# Patient Record
Sex: FEMALE | Race: BLACK OR AFRICAN AMERICAN | NOT HISPANIC OR LATINO | Employment: FULL TIME | URBAN - METROPOLITAN AREA
[De-identification: names, ages, dates, MRNs, and addresses within clinical notes are randomized per-mention and may not be internally consistent; named-entity substitution may affect disease eponyms.]

---

## 2023-04-06 LAB
CLUE CELLS: NORMAL
NUGENT SCORE: 1
YEAST: NORMAL

## 2023-04-07 LAB
CHLAMYDIA TRACH., AMPLIFIED: NEGATIVE
N. GONORRHEA, AMPLIFIED: NEGATIVE
TRICHOMONAS VAGINALIS: NEGATIVE

## 2023-06-27 ENCOUNTER — APPOINTMENT (OUTPATIENT)
Dept: LAB | Facility: LAB | Age: 43
End: 2023-06-27
Payer: COMMERCIAL

## 2023-07-19 DIAGNOSIS — I10 PRIMARY HYPERTENSION: ICD-10-CM

## 2023-07-19 RX ORDER — AMLODIPINE BESYLATE 10 MG/1
10 TABLET ORAL DAILY
COMMUNITY
End: 2023-07-19 | Stop reason: SDUPTHER

## 2023-07-19 RX ORDER — DICLOFENAC SODIUM 75 MG/1
75 TABLET, DELAYED RELEASE ORAL 2 TIMES DAILY
COMMUNITY
Start: 2023-03-13 | End: 2024-01-29 | Stop reason: WASHOUT

## 2023-07-19 RX ORDER — ALBUTEROL SULFATE 90 UG/1
2 AEROSOL, METERED RESPIRATORY (INHALATION) EVERY 4 HOURS PRN
COMMUNITY
Start: 2014-01-30 | End: 2024-01-29 | Stop reason: WASHOUT

## 2023-07-19 RX ORDER — AMLODIPINE BESYLATE 10 MG/1
10 TABLET ORAL DAILY
Qty: 30 TABLET | Refills: 11 | Status: SHIPPED | OUTPATIENT
Start: 2023-07-19 | End: 2024-07-18

## 2023-08-22 LAB
ALANINE AMINOTRANSFERASE (SGPT) (U/L) IN SER/PLAS: 21 U/L (ref 7–45)
ALBUMIN (G/DL) IN SER/PLAS: 3.8 G/DL (ref 3.4–5)
ALKALINE PHOSPHATASE (U/L) IN SER/PLAS: 100 U/L (ref 33–110)
ANION GAP IN SER/PLAS: 8 MMOL/L (ref 10–20)
APPEARANCE, URINE: CLEAR
ASPARTATE AMINOTRANSFERASE (SGOT) (U/L) IN SER/PLAS: 18 U/L (ref 9–39)
BASOPHILS (10*3/UL) IN BLOOD BY AUTOMATED COUNT: 0.04 X10E9/L (ref 0–0.1)
BASOPHILS/100 LEUKOCYTES IN BLOOD BY AUTOMATED COUNT: 0.6 % (ref 0–2)
BILIRUBIN TOTAL (MG/DL) IN SER/PLAS: 0.3 MG/DL (ref 0–1.2)
BILIRUBIN, URINE: NEGATIVE
BLOOD, URINE: ABNORMAL
CALCIUM (MG/DL) IN SER/PLAS: 9.2 MG/DL (ref 8.6–10.3)
CARBON DIOXIDE, TOTAL (MMOL/L) IN SER/PLAS: 28 MMOL/L (ref 21–32)
CHLORIDE (MMOL/L) IN SER/PLAS: 102 MMOL/L (ref 98–107)
COLOR, URINE: YELLOW
CREATININE (MG/DL) IN SER/PLAS: 0.68 MG/DL (ref 0.5–1.05)
EOSINOPHILS (10*3/UL) IN BLOOD BY AUTOMATED COUNT: 0.13 X10E9/L (ref 0–0.7)
EOSINOPHILS/100 LEUKOCYTES IN BLOOD BY AUTOMATED COUNT: 2.1 % (ref 0–6)
ERYTHROCYTE DISTRIBUTION WIDTH (RATIO) BY AUTOMATED COUNT: 12.5 % (ref 11.5–14.5)
ERYTHROCYTE MEAN CORPUSCULAR HEMOGLOBIN CONCENTRATION (G/DL) BY AUTOMATED: 34.1 G/DL (ref 32–36)
ERYTHROCYTE MEAN CORPUSCULAR VOLUME (FL) BY AUTOMATED COUNT: 87 FL (ref 80–100)
ERYTHROCYTES (10*6/UL) IN BLOOD BY AUTOMATED COUNT: 4.23 X10E12/L (ref 4–5.2)
GFR FEMALE: >90 ML/MIN/1.73M2
GLUCOSE (MG/DL) IN SER/PLAS: 85 MG/DL (ref 74–99)
GLUCOSE, URINE: NEGATIVE MG/DL
HEMATOCRIT (%) IN BLOOD BY AUTOMATED COUNT: 36.9 % (ref 36–46)
HEMOGLOBIN (G/DL) IN BLOOD: 12.6 G/DL (ref 12–16)
IMMATURE GRANULOCYTES/100 LEUKOCYTES IN BLOOD BY AUTOMATED COUNT: 0.3 % (ref 0–0.9)
KETONES, URINE: NEGATIVE MG/DL
LEUKOCYTE ESTERASE, URINE: NEGATIVE
LEUKOCYTES (10*3/UL) IN BLOOD BY AUTOMATED COUNT: 6.3 X10E9/L (ref 4.4–11.3)
LYMPHOCYTES (10*3/UL) IN BLOOD BY AUTOMATED COUNT: 1.85 X10E9/L (ref 1.2–4.8)
LYMPHOCYTES/100 LEUKOCYTES IN BLOOD BY AUTOMATED COUNT: 29.4 % (ref 13–44)
MONOCYTES (10*3/UL) IN BLOOD BY AUTOMATED COUNT: 0.52 X10E9/L (ref 0.1–1)
MONOCYTES/100 LEUKOCYTES IN BLOOD BY AUTOMATED COUNT: 8.3 % (ref 2–10)
MUCUS, URINE: NORMAL /LPF
NEUTROPHILS (10*3/UL) IN BLOOD BY AUTOMATED COUNT: 3.74 X10E9/L (ref 1.2–7.7)
NEUTROPHILS/100 LEUKOCYTES IN BLOOD BY AUTOMATED COUNT: 59.3 % (ref 40–80)
NITRITE, URINE: NEGATIVE
PH, URINE: 6 (ref 5–8)
PLATELETS (10*3/UL) IN BLOOD AUTOMATED COUNT: 279 X10E9/L (ref 150–450)
POTASSIUM (MMOL/L) IN SER/PLAS: 3.9 MMOL/L (ref 3.5–5.3)
PROTEIN TOTAL: 7.9 G/DL (ref 6.4–8.2)
PROTEIN, URINE: NEGATIVE MG/DL
RBC, URINE: 2 /HPF (ref 0–5)
SODIUM (MMOL/L) IN SER/PLAS: 134 MMOL/L (ref 136–145)
SPECIFIC GRAVITY, URINE: 1.01 (ref 1–1.03)
SQUAMOUS EPITHELIAL CELLS, URINE: 1 /HPF
UREA NITROGEN (MG/DL) IN SER/PLAS: 14 MG/DL (ref 6–23)
UROBILINOGEN, URINE: <2 MG/DL (ref 0–1.9)
WBC, URINE: 1 /HPF (ref 0–5)

## 2023-08-23 LAB
ABO GROUP (TYPE) IN BLOOD: NORMAL
ANTIBODY SCREEN: NORMAL
RH FACTOR: NORMAL

## 2023-08-24 LAB — STAPH/MRSA SCREEN, CULTURE: ABNORMAL

## 2023-08-25 ENCOUNTER — HOSPITAL ENCOUNTER (OUTPATIENT)
Dept: DATA CONVERSION | Facility: HOSPITAL | Age: 43
End: 2023-08-25
Attending: STUDENT IN AN ORGANIZED HEALTH CARE EDUCATION/TRAINING PROGRAM | Admitting: STUDENT IN AN ORGANIZED HEALTH CARE EDUCATION/TRAINING PROGRAM
Payer: COMMERCIAL

## 2023-08-25 DIAGNOSIS — N93.9 ABNORMAL UTERINE AND VAGINAL BLEEDING, UNSPECIFIED: ICD-10-CM

## 2023-08-25 DIAGNOSIS — D25.9 LEIOMYOMA OF UTERUS, UNSPECIFIED: ICD-10-CM

## 2023-08-25 DIAGNOSIS — N92.0 EXCESSIVE AND FREQUENT MENSTRUATION WITH REGULAR CYCLE: ICD-10-CM

## 2023-08-31 LAB
COMPLETE PATHOLOGY REPORT: NORMAL
CONVERTED CLINICAL DIAGNOSIS-HISTORY: NORMAL
CONVERTED FINAL DIAGNOSIS: NORMAL
CONVERTED FINAL REPORT PDF LINK TO COPY AND PASTE: NORMAL
CONVERTED GROSS DESCRIPTION: NORMAL
CONVERTED INTRAOPERATIVE DIAGNOSIS: NORMAL

## 2023-09-29 VITALS
DIASTOLIC BLOOD PRESSURE: 89 MMHG | RESPIRATION RATE: 14 BRPM | HEIGHT: 61 IN | TEMPERATURE: 97.9 F | WEIGHT: 130.07 LBS | SYSTOLIC BLOOD PRESSURE: 132 MMHG | HEART RATE: 79 BPM | BODY MASS INDEX: 24.56 KG/M2

## 2023-09-30 NOTE — H&P
History of Present Illness:   Pregnant/Lactating:  ·  Are You Pregnant no   ·  Are You Currently Breastfeeding no     History Present Illness:  Reason for surgery: AUB-L   HPI:    41 yo presents for TLH- BS in setting of AUB-L.    FamHx of cancer, saw genetics prior and no genetic disease causing mutations detected.    Pre op EMB: Endocervical mucosa, no endometrial component  Pre op Labs: Hgb 12.6, Cr 0.68  tVUS:  IMPRESSION: 1. Nonenlarged fibroid uterus. 2. The endometrial canal is not thickened. 3. There is a 1.6 x 0.9 x 0.6 cm pedunculated polyp with a long stalk in the fundal and upper uterine body portions of the endometrial canal.    Last pap: cotest neg 2023  Last mammogram 2/2023 BIRADS 1   CBC 2019 WNL, no symptoms of anemia reported   PMHx: HTN, was pre-diabetic but now resolved, anxiety  PSHx: none laparoscopic polyp removal at [facility]: amlodipine 10 mg      Allergies:        Allergies:  ·  No Known Allergies :     Home Medication Review:   Home Medications Reviewed: yes     Impression/Procedure:   ·  Impression and Planned Procedure: 41 yo presents for TLH- BS in setting of AUB-L.       ERAS (Enhanced Recovery After Surgery):  ·  ERAS Patient: yes   ·  CPM/PAT Utilization: yes   ·  Immunonutrition Recovery Drink Utilization: no   ·  Carbohydrate Supplement Drink Utilization: no       Vital Signs:  Temperature C: 36.6 degrees C   Temperature F: 97.8 degrees F   Heart Rate: 79 beats per minute   Respiratory Rate: 14 breath per minute   Blood Pressure Systolic: 132 mm/Hg   Blood Pressure Diastolic: 89 mm/Hg     Physical Exam by System:    Constitutional: No apparent distress   Eyes: PERRL, EOMI, clear sclera   Head/Neck: Neck supple   Respiratory/Thorax: Normal work of breathing   Cardiovascular: Regular rate and rhythm   Gastrointestinal: Soft non tender, non distended   Genitourinary: Deferred exam until OR, will assess  intraoperatively   Musculoskeletal: Normal gross movement of extremities    Extremities: No lower extremity edema   Neurological: No focal deficits   Psychological: Appropriate mood and affect   Skin: No rashes or lesions     Consent:   COVID-19 Consent:  ·  COVID-19 Risk Consent Surgeon has reviewed key risks related to the risk of cornelius COVID-19 and if they contract COVID-19 what the risks are.     Attestation:   Note Completion:  I am a:  Resident/Fellow   Attending Attestation I saw and evaluated the patient.  I personally obtained the key and critical portions of the history and physical exam or was physically present for key and  critical portions performed by the resident/fellow. I reviewed the resident/fellow?s documentation and discussed the patient with the resident/fellow.  I agree with the resident/fellow?s medical decision making as documented in the note.     I personally evaluated the patient on 25-Aug-2023         Electronic Signatures:  Angelica Sweet (Resident))  (Signed 25-Aug-2023 07:19)   Authored: History of Present Illness, Allergies, Home  Medication Review, Impression/Procedure, ERAS, Physical Exam, Consent, Note Completion  Debra Sullivan)  (Signed 25-Aug-2023 14:49)   Authored: Note Completion   Co-Signer: History of Present Illness, Allergies, Home Medication Review, Impression/Procedure, ERAS, Physical Exam, Consent, Note Completion      Last Updated: 25-Aug-2023 14:49 by Debra Sullivan)

## 2023-10-01 NOTE — OP NOTE
Post Operative Note:     PreOp Diagnosis: AUB-L   Post-Procedure Diagnosis: AUB-L, Multifibroid uterus   Procedure: 1. Total laparascopic hysterectomy  2. Bilateral salpingectomy  3. Cystocopy   Surgeon: Dr Bailey   Resident/Fellow/Other Assistant: Dr Sweet   I.V. Fluids: 1500   Estimated Blood Loss (mL): 15   Specimen: yes   Findings: Uterus with few small fibroids (fundal,  posterior), normal appearing bilateral fallopian tubes and ovaries, normal appendix and upper abdominal survey. Benign frozen endometrial pathology.   Urine Output: 300     Operative Report Dictated:  Dictation: not applicable - note contains Operative  Report   Operative Report:    After consent was obtained, the patient was taken to the operating room, where she was placed on the operating room table in the supine position, and general anesthesia  was established without difficulty. She was then placed in the dorsal lithotomy position with her legs in Yellofin stirrups, and her abdomen and vagina were then prepped and draped in the usual sterile fashion. Prior to the start of the procedure, an  orogastric tube was placed for gastric decompression. A Castellano catheter was then inserted into the bladder and noted to be draining clear urine at the start of the procedure. A speculum was placed in the patient's vagina, and the anterior lip of the cervix  was grasped with a single-tooth tenaculum. The cervix was dilated.     Then attention was turned to the abdomen, where a 10 mm horizontal skin incision was made at the base of the umbilicus, and the abdomen was entered without difficulty with open Goetz  technique. A 10 mm trocar with balloon was then inserted. Once intraabdominal location was confirmed using the 10 mm laparoscope, pneumoperitoneum was achieved. We next performed an abdominal survey, where there was noted to be no injury to viscera or  vessels at the time of port placement and abdominal entry. Survey of the upper abdomen  revealed normal anatomy, then the patient was placed in steep Trendelenburg. We placed additional trocars under direct visualization, one in the right lower qudrant,  one in the left lower quadrant, and one suprapubic. All were 5 mm ports placed without any difficulty.  A VCare uterine manipulator was inserted into the uterus without difficulty under direct visualization.    First, the left fallopian tube was detached by advancing through the mesosalpinx with the LigaSure from the fimbria to the cornua. The same procedure was performed on the right side.     Next, the right utero-ovarian ligament was coagulated and transected with the LigaSure. Then, the right round ligament was transected using the LigaSure device. The LigaSure was used to advance through the anterior leaf of the broad ligament down to the  level of the VCare uterine manipulator cup at the uterine isthmus. Monopolar cautery was used to develop a bladder flap anteriorly. Next, the posterior peritoneum was developed with the LigaSure by cauterizing and transecting serially down from the level  of the round ligament to the uterosacral ligament. The uterine vessels were then skeletonized. Again the ureter was noted to be well below the level of the uterine vessels, and the Ligasure was used to grasp the uterine vessels at the level of the internal  os. They were sealed and then divided. Then the LigaSure was used to grasp and cauterize the cardinal ligament.    Then, the same procedure was performed on the left side. The left utero-ovarian ligament was coagulated and transected with the LigaSure. Then, the right round ligament was transected using the LigaSure device. The LigaSure was used to advance through  the anterior leaf of the broad ligament down to the level of the VCare uterine manipulator cup at the uterine isthmus. The monopolar cautrey was used to develop a bladder flap anteriorly. Next, the posterior peritoneum was developed with the  LigaSure  by cauterizing and transecting serially down from the level of the round ligament to the uterosacral ligament. The uterine vesseles were then skeletonized. Again the ureter was noted to be well below the level of the uterine vessels, and the Ligasure  was used to grasp the uterine vessels at the level of the internal os. They were sealed and then divided. Then the LigaSure was used to grasp and cauterize the cardinal ligament.    Next, the Vcare cup was palpated, and monopolar cautery was used to make a circumferential colpotomy. The VCare uterine manipulator was removed from the patient's vagina. The cervix was then brought into the vagina, grasped with ring forceps. The cervix,  fallopian tubes, and uterus were removed vaginally without difficulty under direct visualization. The pathology was sent for frozen pathology, as previously discussed with patient given insufficient EMB sampling.    The occluder balloon was reintroduced along with the 0 V-Loc suture. The vaginal cuff was then closed using the 0 V-Loc in a running continuous fashion, and good hemostasis was noted at the closure of the cuff. The pelvis was irrigated, and good hemostasis  was noted throughout.    Frozen endometrial pathology was benign.    Next, we performed a cystoscopy that revealed an intact bladder with normal jets of urine effluxing from the bilateral ureters. The cystoscope was withdrawn, and the bladder was drained and the Castellano remained out of the patient, and all instruments were  removed from the patient's vagina. We then removed all trocars from the patient's abdomen, and the fascia from the umbilical port site was closed using 0-Vicryl in a figure of eight fashion. The port sites were closed using a 4-0 Monocryl in an interrupted  fashion.      The patient tolerated the procedure well, and there were no complications. Dr. Sullivan was present for all key portions of the procedure, and the surgical counts were noted to be  correct x2. Anesthesia was reversed and the patient was taken back to the  PACU in stable condition.     Attestation:   Note Completion:  I am a: Resident/Fellow   Attending Attestation I was present for the entire procedure          Electronic Signatures:  Angelica Sweet (Resident))  (Signed 25-Aug-2023 11:10)   Authored: Post Operative Note, Note Completion  Debra Sullivan)  (Signed 25-Aug-2023 14:50)   Authored: Note Completion   Co-Signer: Post Operative Note, Note Completion      Last Updated: 25-Aug-2023 14:50 by Debra Sullivan)

## 2023-12-04 ENCOUNTER — TELEPHONE (OUTPATIENT)
Dept: SURGICAL ONCOLOGY | Facility: CLINIC | Age: 43
End: 2023-12-04
Payer: COMMERCIAL

## 2023-12-04 NOTE — TELEPHONE ENCOUNTER
Ms. Mayes was referred to the high risk breast clinic 8/1/23 by Justa De La Cruz genetics counselor.  I navigated her at that time and told her I would touch base in the fall.  I spoke with her this morning.  She would like me to reach out in January 2024 to schedule her annual mammogram and appointment with a breast provider at that time.

## 2024-01-29 ENCOUNTER — OFFICE VISIT (OUTPATIENT)
Dept: OBSTETRICS AND GYNECOLOGY | Facility: CLINIC | Age: 44
End: 2024-01-29
Payer: COMMERCIAL

## 2024-01-29 VITALS
WEIGHT: 137 LBS | SYSTOLIC BLOOD PRESSURE: 122 MMHG | DIASTOLIC BLOOD PRESSURE: 82 MMHG | BODY MASS INDEX: 25.86 KG/M2 | HEIGHT: 61 IN

## 2024-01-29 DIAGNOSIS — N89.8 VAGINAL DISCHARGE: Primary | ICD-10-CM

## 2024-01-29 PROCEDURE — 87205 SMEAR GRAM STAIN: CPT

## 2024-01-29 PROCEDURE — 99214 OFFICE O/P EST MOD 30 MIN: CPT | Performed by: ADVANCED PRACTICE MIDWIFE

## 2024-01-29 NOTE — PROGRESS NOTES
"Subjective   Patient ID: Mele Mayes is a 43 y.o. female who presents for 8/2023 [facility] done.  Reports that she did not feel like being \"probed\" during her normal postop visit.  She reports some vaginal dryness and an onion odor.  No sexual activity.  HPI    Review of Systems    Objective   Physical Exam  A&O x 3   Calm and cooperative   Respirations unlabored and even  Abdomen non tender  EGBUS normal  Vagina physiologic discharge  Cervix and uterus absent  Adnexa NT    Assessment/Plan   Problem List Items Addressed This Visit    None  Visit Diagnoses         Codes    Vaginal discharge    -  Primary N89.8    Relevant Orders    Vaginitis Gram Stain For Bacterial Vaginosis + Yeast                 JAISON Bray-ALEXIS 01/29/24 3:03 PM   "

## 2024-01-30 LAB
CLUE CELLS VAG LPF-#/AREA: NORMAL /[LPF]
NUGENT SCORE: 3
YEAST VAG WET PREP-#/AREA: NORMAL

## 2024-02-19 RX ORDER — OXYCODONE HYDROCHLORIDE 5 MG/1
TABLET ORAL
COMMUNITY
Start: 2023-08-25 | End: 2024-02-27 | Stop reason: WASHOUT

## 2024-02-26 NOTE — PROGRESS NOTES
Mele Mayes female   1980 43 y.o.   [id number]      Chief Complaint    New Patient Visit          [facility]  Mele Mayes is a 43 y.o.  AA female RTA  ref[facility] by genetic to the [facility] for high risk breast surveillance care. She denies breast biopsy and surgery. In 2023, she had a negative 77 gene CancerNext panel. Family history of breast cancer in mother and maternal grandmother.     BREAST IMAGIN2023 bilateral screening mammogram, BI-RADS Category 2.     REPRODUCTIVE HISTORY: menarche age 13, nulliparous, menopause age unknown, extremely dense breast tissue     FAMILY CANCER HISTORY:   Mother: Breast cancer age 40  Maternal grandmother: Breast cancer age 30    REVIEW OF SYSTEMS    Constitutional:  Negative for appetite change, fatigue, fever and unexpected weight change.   HENT:  Negative for ear pain, hearing loss, nosebleeds, sore throat and trouble swallowing.    Eyes:  Negative for discharge, itching and visual disturbance.   Respiratory:  Negative for cough, chest tightness and shortness of breath.    Cardiovascular:  Negative for chest pain, palpitations and leg swelling.   Breast: as indicated in HPI  Gastrointestinal:  Negative for abdominal pain, constipation, diarrhea and nausea.   Endocrine: Negative for cold intolerance and heat intolerance.   Genitourinary:  Negative for dysuria, frequency, hematuria, pelvic pain and vaginal bleeding.   Musculoskeletal:  Negative for arthralgias, back pain, gait problem, joint swelling and myalgias.   Skin:  Negative for color change and rash.   Allergic/Immunologic: Negative for environmental allergies and food allergies.   Neurological:  Negative for dizziness, tremors, speech difficulty, weakness, numbness and headaches.   Hematological:  Does not bruise/bleed easily.   Psychiatric/Behavioral:  Negative for agitation, dysphoric mood and sleep disturbance. The patient is not nervous/anxious.         MEDICATIONS  Current  Outpatient Medications   Medication Instructions    amLODIPine (NORVASC) 10 mg, oral, Daily        ALLERGIES  No Known Allergies     Past Medical History:   Diagnosis Date    Other specified diabetes mellitus without complications (CMS/HCC)     Diabetes mellitus of other type without complication    Personal history of other diseases of the circulatory system     History of hypertension    Personal history of other medical treatment 05/25/2016    History of screening mammography      Past Surgical History:   Procedure Laterality Date    HYSTERECTOMY        Family History   Problem Relation Name Age of Onset    Breast cancer Mother  40    Hypertension Father      Diabetes Father      Breast cancer Maternal Grandmother  30          SOCIAL HISTORY      Social History     Tobacco Use    Smoking status: Never    Smokeless tobacco: Never   Substance Use Topics    Alcohol use: Yes        VITALS  Vitals:    02/27/24 1337   BP: 134/87   Pulse: 61        PHYSICAL EXAM  Patient is alert and oriented x3, with appropriate mood. The gait is steady and hand grasps are equal. Sclera clear. The breasts are nearly symmetrical. The tissue is soft without palpable abnormalities, discrete nodules or masses. The skin and nipples appear normal. There is no cervical, supraclavicular, or axillary lymphadenopathy palpable. Heart rate and rhythm normal, S1 and S2 appreciated. The lungs are clear bilaterally. Abdomen is soft & non-tender.    Physical Exam     IMAGING  BI mammo bilateral screening tomosynthesis 02/27/2024    Narrative  Interpreted By:  Carlos Mason,  STUDY:  BI MAMMO BILATERAL SCREENING TOMOSYNTHESIS;  2/27/2024 1:40 pm    INDICATION:  Screening. Patient has a family history of breast cancer in her  mother at age 50 and paternal grandmother at age 86.    Density:  The breast tissue is extremely dense, which may limit the  sensitivity of mammography.    No suspicious masses or calcifications are identified.    Impression  No  mammographic evidence of malignancy.      BI-RADS Category:  1 Negative.  Recommendation:  Annual Screening.  Recommended Date:  1 Year.  Laterality:  Bilateral.        Time was spent viewing digital images of the radiology testing with the patient. I explained the results in depth, along with suggested explanation for follow up recommendations based on the testing results.          ORDERS  Orders Placed This Encounter   Procedures    BI mammo bilateral screening tomosynthesis     Standing Status:   Future     Standing Expiration Date:   4/27/2025     Order Specific Question:   Is the patient pregnant?     Answer:   No     Order Specific Question:   Reason for exam:     Answer:   clinic screen     Order Specific Question:   Radiologist to Determine Optimal Study     Answer:   Yes     Order Specific Question:   Release result to Shweeb     Answer:   Immediate [1]     Order Specific Question:   Is this exam part of a Research Study? If Yes, link this order to the research study     Answer:   No          ASSESSMENT/PLAN  1. Breast cancer screening, high risk patient  Clinic Appointment Request Follow Up    BI mammo bilateral screening tomosynthesis    Clinic Appointment Request Follow Up; SANJAY LU ([facility] screen mammogram)             HIGH RISK PLAN  Yearly mammogram with digital breast tomosynthesis  Twice yearly clinical breast examinations  Breast MRI (to schedule call [phone]) - recommended.  Monthly self breast examinations &/or regular self breast awareness  Vitamin D3 2000 IU/daily (over the counter) unless your PCP recommends you take a specific dose  Exercise 3-4 times per week for 45-60 minutes  Limit alcohol to 3-4 drinks per week if you are menopausal  Eat healthy low-fat diet with lots of vegetable & fruits  Risk models indicate personal risk of breast cancer in the next 5 years and lifetime (age 85-90):  Breast Cancer Risk Assessment Tool (Felicita): 5-year risk 1.2% (average 0.8%), lifetime risk  14.7% (average 9.7%).   Justyn: 5-year risk 2.4% (average 0.8%), lifetime risk 28.3%, (average 10.5%)      Follow up in 6 months for clinical exam with JIMMIE Headley. Recommended endocrine therapy and breast MRI, patient declined at this time.    JIMMIE Sylvester  [facility]

## 2024-02-27 ENCOUNTER — OFFICE VISIT (OUTPATIENT)
Dept: SURGICAL ONCOLOGY | Facility: CLINIC | Age: 44
End: 2024-02-27
Payer: COMMERCIAL

## 2024-02-27 ENCOUNTER — HOSPITAL ENCOUNTER (OUTPATIENT)
Dept: RADIOLOGY | Facility: CLINIC | Age: 44
Discharge: HOME | End: 2024-02-27
Payer: COMMERCIAL

## 2024-02-27 VITALS
BODY MASS INDEX: 25.32 KG/M2 | DIASTOLIC BLOOD PRESSURE: 87 MMHG | SYSTOLIC BLOOD PRESSURE: 134 MMHG | HEART RATE: 61 BPM | WEIGHT: 134 LBS

## 2024-02-27 VITALS — BODY MASS INDEX: 25.86 KG/M2 | HEIGHT: 61 IN | WEIGHT: 137 LBS

## 2024-02-27 DIAGNOSIS — R92.343 EXTREMELY DENSE TISSUE OF BOTH BREASTS ON MAMMOGRAPHY: ICD-10-CM

## 2024-02-27 DIAGNOSIS — Z12.39 BREAST CANCER SCREENING, HIGH RISK PATIENT: Primary | ICD-10-CM

## 2024-02-27 DIAGNOSIS — Z80.3 FAMILY HISTORY OF BREAST CANCER IN MOTHER: ICD-10-CM

## 2024-02-27 PROCEDURE — 77067 SCR MAMMO BI INCL CAD: CPT | Performed by: STUDENT IN AN ORGANIZED HEALTH CARE EDUCATION/TRAINING PROGRAM

## 2024-02-27 PROCEDURE — 77067 SCR MAMMO BI INCL CAD: CPT

## 2024-02-27 PROCEDURE — 99214 OFFICE O/P EST MOD 30 MIN: CPT | Performed by: NURSE PRACTITIONER

## 2024-02-27 PROCEDURE — 77063 BREAST TOMOSYNTHESIS BI: CPT | Performed by: STUDENT IN AN ORGANIZED HEALTH CARE EDUCATION/TRAINING PROGRAM

## 2024-02-27 PROCEDURE — 99204 OFFICE O/P NEW MOD 45 MIN: CPT | Performed by: NURSE PRACTITIONER

## 2024-02-27 ASSESSMENT — PAIN SCALES - GENERAL: PAINLEVEL: 0-NO PAIN

## 2024-02-27 NOTE — PATIENT INSTRUCTIONS
Follow up in 6 months for clinical exam with Anay Shukla, APRN-CNP    You can see your health information, review clinical summaries from office visits & test results online when you follow your health with MY  Chart, a personal health record. To sign up go to [email or web address]. If you need assistance with signing up or trouble getting into your account call [employer] Patient Line 24/7 at [phone].

## 2024-03-06 NOTE — CONSULTS
Service:   Service: Anesthesia - Pain     Consult:  Consult requested by (Attending Name): Dr. Sullivan   Reason: Postoperative pain     History of Present Illness:   HPI:    EVERARDO MEZA is a 42 year old Female  who presents for total laparoscopic hysterectomy and bilateral salpingectomy with Dr. Sullivan on 8/25/23. Acute Pain consulted  for block for postoperative pain control.     Anticipated Postop Pain Issues -   Palliative: typically relieved with IV analgesics and regional local anesthetics  Provocative: typically with movement  Quality: typically burning and aching  Radiation: typically none  Severity: typically severe 8-10/10  Timing: typically constant    PMH:  HTN, snoring, GERD, dysfunctional uterine bleeding    PSH:  colonoscopy    FHx:  negative family history    SHx:  never smoker, denies ETOH, denies illicit drug use    Allergies: NKDA    ROS: all 10 points of review of system are negative including:  GENERAL, CONSTITUTIONAL: no Recent weight loss, Fever, Chills  EYES, VISION: no Visual Changes  EARS, NOSE, THROAT: no Hearing loss  HEART, CARDIOVASCULAR : no Chest pain, Arrythmia, palpitations, Shortness of breath, Peripheral edema  RESPIRATORY: no Cough, Shortness of breath, Wheezing   GASTROINTESTINAL: no Abdominal pain, Bloody stool   GENITOURINARY: no Frequent urination, Urgency   MUSCULOSKELETAL: no Joint pain, swelling, Musculoskeletal pain   SKIN & INTEGUMENTARY: no Rashes or Sores   NEUROLOGICAL: no Numbness or tingling sensations  PSYCHIATRIC: no Anxiety or Depression        Review Family/Social History and ROS:   Social History:    Smoking Status: former smoker  (1)   Alcohol Use: denies (1)   Drug Use: denies  (1)            Allergies:  ·  No Known Allergies :     Objective:   Physical Exam Narrative:  ·  Physical Exam:    Physical Exam:  Constitutional:  no distress, alert and cooperative  Eyes: clear sclera  Head/Neck: No apparent injury, trachea midline  Respiratory/Thorax:  "Patent airways, thorax symmetric, breathing comfortably  Cardiovascular: no pitting edema  Gastrointestinal: Nondistended  Musculoskeletal: ROM intact  Extremities: no clubbing  Neurological: alert, monahan x4  Psychological: Appropriate affect        Assessment:    EVERARDO MEZA is a 42 year old Female  who presents for total laparoscopic hysterectomy and bilateral salpingectomy with Dr. Sullivan on 8/25/23. Acute Pain consulted  for block for postoperative pain control.     - BL quadratus lumborum single shot nerve blocks performed preoperatively  - Pain medications per primary team  - Will see on POD1 if inpatient    Acute Pain Resident  pg [phone] ph [phone]      Attestation:   Note Completion:  I am a:  Resident/Fellow   Attending Attestation I saw and evaluated the patient.  I personally obtained the key and critical portions of the history and physical exam or was physically present for key and  critical portions performed by the resident/fellow. I reviewed the resident/fellow?s documentation and discussed the patient with the resident/fellow.  I agree with the resident/fellow?s medical decision making as documented in the note.     I personally evaluated the patient on 25-Aug-2023         Electronic Signatures:  Alicia Saenz)  (Signed 29-Aug-2023 13:20)   Authored: Note Completion   Co-Signer: Service, History of Present Illness, Review Family/Social History and ROS, Allergies, Objective, Assessment/Recommendations,  Note Completion  Ashley Escobar (Resident))  (Signed 25-Aug-2023 07:36)   Authored: Service, History of Present Illness, Review  Family/Social History and ROS, Allergies, Objective, Assessment/Recommendations, Note Completion      Last Updated: 29-Aug-2023 13:20 by Alicia Saenz)    References:  1.  Data Referenced From \"History and Physical\" 22-Aug-2023 15:12   "

## 2024-04-01 PROBLEM — J34.3 HYPERTROPHY OF NASAL TURBINATES: Status: ACTIVE | Noted: 2024-04-01

## 2024-04-01 PROBLEM — R05.9 COUGH: Status: ACTIVE | Noted: 2024-04-01

## 2024-04-01 PROBLEM — F41.9 ANXIETY DUE TO INVASIVE PROCEDURE: Status: ACTIVE | Noted: 2024-04-01

## 2024-04-01 PROBLEM — E11.9 DIABETES MELLITUS WITHOUT COMPLICATION (MULTI): Status: ACTIVE | Noted: 2024-04-01

## 2024-04-01 PROBLEM — N94.6 DYSMENORRHEA: Status: ACTIVE | Noted: 2024-04-01

## 2024-04-01 PROBLEM — N92.0 HEAVY MENSTRUAL BLEEDING: Status: ACTIVE | Noted: 2024-04-01

## 2024-04-01 PROBLEM — E55.9 VITAMIN D DEFICIENCY: Status: ACTIVE | Noted: 2022-04-05

## 2024-04-01 PROBLEM — J35.8 TONSILLITH: Status: ACTIVE | Noted: 2024-04-01

## 2024-04-01 PROBLEM — J31.0 CHRONIC RHINITIS: Status: ACTIVE | Noted: 2024-04-01

## 2024-04-01 PROBLEM — N92.6 IRREGULAR BLEEDING: Status: ACTIVE | Noted: 2024-04-01

## 2024-04-01 PROBLEM — Z86.79 HISTORY OF HYPERTENSION: Status: ACTIVE | Noted: 2024-04-01

## 2024-04-01 PROBLEM — R09.A2 SENSATION OF LUMP IN THROAT: Status: ACTIVE | Noted: 2024-04-01

## 2024-04-01 PROBLEM — K21.9 GASTROESOPHAGEAL REFLUX DISEASE: Status: ACTIVE | Noted: 2022-04-05

## 2024-04-01 PROBLEM — J34.89 NASAL OBSTRUCTION: Status: ACTIVE | Noted: 2024-04-01

## 2024-04-01 PROBLEM — R06.83 SNORING: Status: ACTIVE | Noted: 2024-04-01

## 2024-04-01 PROBLEM — J34.89 NASAL AND SINUS DISCHARGE: Status: ACTIVE | Noted: 2024-04-01

## 2024-04-01 PROBLEM — R49.0 HOARSENESS: Status: ACTIVE | Noted: 2024-04-01

## 2024-04-01 PROBLEM — K21.9 LARYNGOPHARYNGEAL REFLUX (LPR): Status: ACTIVE | Noted: 2024-04-01

## 2024-04-01 PROBLEM — R19.6 HALITOSIS: Status: ACTIVE | Noted: 2024-04-01

## 2024-04-01 PROBLEM — R06.2 WHEEZING: Status: ACTIVE | Noted: 2024-04-01

## 2024-04-01 PROBLEM — E63.9 POOR DIET: Status: ACTIVE | Noted: 2024-04-01

## 2024-04-01 PROBLEM — M79.676 PAIN OF GREAT TOE: Status: ACTIVE | Noted: 2024-04-01

## 2024-04-01 PROBLEM — J34.89 NASAL CRUSTING: Status: ACTIVE | Noted: 2024-04-01

## 2024-04-01 PROBLEM — I10 HYPERTENSION: Status: ACTIVE | Noted: 2024-04-01

## 2024-04-01 PROBLEM — N89.8 VAGINAL DISCHARGE: Status: ACTIVE | Noted: 2024-04-01

## 2024-04-01 PROBLEM — F41.9 ANXIETY: Status: ACTIVE | Noted: 2024-04-01

## 2024-04-01 PROBLEM — I10 BENIGN ESSENTIAL HYPERTENSION: Status: ACTIVE | Noted: 2024-04-01

## 2024-04-01 PROBLEM — N93.9 ABNORMAL UTERINE BLEEDING: Status: ACTIVE | Noted: 2024-04-01

## 2024-04-01 PROBLEM — R06.89 SLEEP RELATED CHOKING SENSATION: Status: ACTIVE | Noted: 2024-04-01

## 2024-04-01 PROBLEM — R74.8 ELEVATED ALKALINE PHOSPHATASE LEVEL: Status: ACTIVE | Noted: 2024-04-01

## 2024-04-01 PROBLEM — N92.6 IRREGULAR MENSTRUAL CYCLE: Status: ACTIVE | Noted: 2024-04-01

## 2024-04-01 PROBLEM — R74.8 HIGH GAMMA GLUTAMYL TRANSFERASE (GGT): Status: ACTIVE | Noted: 2024-04-01

## 2024-04-01 PROBLEM — J35.8 AMYGDALOLITH: Status: ACTIVE | Noted: 2024-04-01

## 2024-04-01 PROBLEM — M21.6X2 PRONATION OF BOTH FEET: Status: ACTIVE | Noted: 2024-04-01

## 2024-04-01 PROBLEM — U07.1 COVID-19 VIRUS INFECTION: Status: ACTIVE | Noted: 2024-04-01

## 2024-04-01 PROBLEM — R09.A2 GLOBUS SENSATION: Status: ACTIVE | Noted: 2024-04-01

## 2024-04-01 PROBLEM — J34.2 NASAL SEPTAL DEVIATION: Status: ACTIVE | Noted: 2024-04-01

## 2024-04-01 PROBLEM — M79.675 PAIN OF LEFT GREAT TOE: Status: ACTIVE | Noted: 2024-04-01

## 2024-04-01 PROBLEM — G47.30 SLEEP DISORDER BREATHING: Status: ACTIVE | Noted: 2024-04-01

## 2024-04-01 PROBLEM — M21.6X1 PRONATION OF BOTH FEET: Status: ACTIVE | Noted: 2024-04-01

## 2024-04-01 PROBLEM — G47.30 BREATHING-RELATED SLEEP DISORDER: Status: ACTIVE | Noted: 2024-04-01

## 2024-04-01 PROBLEM — R73.03 PREDIABETES: Status: ACTIVE | Noted: 2024-04-01

## 2024-04-01 PROBLEM — F41.8 ANTICIPATORY ANXIETY: Status: ACTIVE | Noted: 2024-04-01

## 2024-04-01 PROBLEM — R74.8 ELEVATED SERUM GGT LEVEL: Status: ACTIVE | Noted: 2024-04-01

## 2024-04-01 PROBLEM — D25.9 UTERINE LEIOMYOMA: Status: ACTIVE | Noted: 2024-04-01

## 2024-04-01 PROBLEM — N76.0 VAGINITIS: Status: ACTIVE | Noted: 2024-04-01

## 2024-04-01 PROBLEM — M21.6X9 PRONATION OF FOOT: Status: ACTIVE | Noted: 2024-04-01

## 2024-04-01 PROBLEM — U07.1 DISEASE DUE TO SEVERE ACUTE RESPIRATORY SYNDROME CORONAVIRUS 2 (SARS-COV-2): Status: ACTIVE | Noted: 2024-04-01

## 2024-04-01 PROBLEM — N92.0 MENORRHAGIA: Status: ACTIVE | Noted: 2024-04-01

## 2024-04-01 RX ORDER — HYDROXYZINE HYDROCHLORIDE 25 MG/1
25 TABLET, FILM COATED ORAL ONCE
COMMUNITY
Start: 2021-04-27

## 2024-04-02 ENCOUNTER — OFFICE VISIT (OUTPATIENT)
Dept: PRIMARY CARE | Facility: CLINIC | Age: 44
End: 2024-04-02
Payer: COMMERCIAL

## 2024-04-02 VITALS
SYSTOLIC BLOOD PRESSURE: 129 MMHG | DIASTOLIC BLOOD PRESSURE: 86 MMHG | HEART RATE: 82 BPM | HEIGHT: 61 IN | BODY MASS INDEX: 27.03 KG/M2 | TEMPERATURE: 98.2 F | RESPIRATION RATE: 18 BRPM | WEIGHT: 143.2 LBS | OXYGEN SATURATION: 97 %

## 2024-04-02 DIAGNOSIS — E11.9 CONTROLLED TYPE 2 DIABETES MELLITUS WITHOUT COMPLICATION, WITHOUT LONG-TERM CURRENT USE OF INSULIN (MULTI): ICD-10-CM

## 2024-04-02 DIAGNOSIS — R74.8 HIGH GAMMA GLUTAMYL TRANSFERASE (GGT): ICD-10-CM

## 2024-04-02 DIAGNOSIS — E78.2 HYPERLIPIDEMIA, MIXED: ICD-10-CM

## 2024-04-02 DIAGNOSIS — J32.2 SINUSITIS CHRONIC, ETHMOIDAL: Primary | ICD-10-CM

## 2024-04-02 DIAGNOSIS — D50.0 IRON DEFICIENCY ANEMIA DUE TO CHRONIC BLOOD LOSS: ICD-10-CM

## 2024-04-02 DIAGNOSIS — E55.9 VITAMIN D DEFICIENCY: ICD-10-CM

## 2024-04-02 PROCEDURE — 3044F HG A1C LEVEL LT 7.0%: CPT | Performed by: INTERNAL MEDICINE

## 2024-04-02 PROCEDURE — 80061 LIPID PANEL: CPT

## 2024-04-02 PROCEDURE — 83036 HEMOGLOBIN GLYCOSYLATED A1C: CPT

## 2024-04-02 PROCEDURE — 3074F SYST BP LT 130 MM HG: CPT | Performed by: INTERNAL MEDICINE

## 2024-04-02 PROCEDURE — 36415 COLL VENOUS BLD VENIPUNCTURE: CPT

## 2024-04-02 PROCEDURE — 82306 VITAMIN D 25 HYDROXY: CPT

## 2024-04-02 PROCEDURE — 3079F DIAST BP 80-89 MM HG: CPT | Performed by: INTERNAL MEDICINE

## 2024-04-02 PROCEDURE — 85027 COMPLETE CBC AUTOMATED: CPT

## 2024-04-02 PROCEDURE — 82043 UR ALBUMIN QUANTITATIVE: CPT

## 2024-04-02 PROCEDURE — 82570 ASSAY OF URINE CREATININE: CPT

## 2024-04-02 PROCEDURE — 3062F POS MACROALBUMINURIA REV: CPT | Performed by: INTERNAL MEDICINE

## 2024-04-02 PROCEDURE — 80053 COMPREHEN METABOLIC PANEL: CPT

## 2024-04-02 PROCEDURE — 99213 OFFICE O/P EST LOW 20 MIN: CPT | Performed by: INTERNAL MEDICINE

## 2024-04-02 PROCEDURE — 3050F LDL-C >= 130 MG/DL: CPT | Performed by: INTERNAL MEDICINE

## 2024-04-02 RX ORDER — AMOXICILLIN AND CLAVULANATE POTASSIUM 500; 125 MG/1; MG/1
500 TABLET, FILM COATED ORAL 2 TIMES DAILY
Qty: 20 TABLET | Refills: 0 | Status: SHIPPED | OUTPATIENT
Start: 2024-04-02 | End: 2024-04-12

## 2024-04-02 SDOH — ECONOMIC STABILITY: TRANSPORTATION INSECURITY
IN THE PAST 12 MONTHS, HAS THE LACK OF TRANSPORTATION KEPT YOU FROM MEDICAL APPOINTMENTS OR FROM GETTING MEDICATIONS?: NO

## 2024-04-02 SDOH — ECONOMIC STABILITY: HOUSING INSECURITY
IN THE LAST 12 MONTHS, WAS THERE A TIME WHEN YOU DID NOT HAVE A STEADY PLACE TO SLEEP OR SLEPT IN A SHELTER (INCLUDING NOW)?: NO

## 2024-04-02 SDOH — ECONOMIC STABILITY: FOOD INSECURITY: WITHIN THE PAST 12 MONTHS, YOU WORRIED THAT YOUR FOOD WOULD RUN OUT BEFORE YOU GOT MONEY TO BUY MORE.: NEVER TRUE

## 2024-04-02 SDOH — ECONOMIC STABILITY: INCOME INSECURITY: IN THE LAST 12 MONTHS, WAS THERE A TIME WHEN YOU WERE NOT ABLE TO PAY THE MORTGAGE OR RENT ON TIME?: NO

## 2024-04-02 SDOH — ECONOMIC STABILITY: FOOD INSECURITY: WITHIN THE PAST 12 MONTHS, THE FOOD YOU BOUGHT JUST DIDN'T LAST AND YOU DIDN'T HAVE MONEY TO GET MORE.: NEVER TRUE

## 2024-04-02 SDOH — ECONOMIC STABILITY: TRANSPORTATION INSECURITY
IN THE PAST 12 MONTHS, HAS LACK OF TRANSPORTATION KEPT YOU FROM MEETINGS, WORK, OR FROM GETTING THINGS NEEDED FOR DAILY LIVING?: NO

## 2024-04-02 SDOH — ECONOMIC STABILITY: GENERAL
WHICH OF THE FOLLOWING DO YOU KNOW HOW TO USE AND HAVE ACCESS TO EVERY DAY? (CHOOSE ALL THAT APPLY): DESKTOP COMPUTER, LAPTOP COMPUTER, OR TABLET WITH BROADBAND INTERNET CONNECTION;SMARTPHONE WITH CELLULAR DATA PLAN

## 2024-04-02 SDOH — HEALTH STABILITY: PHYSICAL HEALTH: ON AVERAGE, HOW MANY DAYS PER WEEK DO YOU ENGAGE IN MODERATE TO STRENUOUS EXERCISE (LIKE A BRISK WALK)?: 6 DAYS

## 2024-04-02 SDOH — HEALTH STABILITY: PHYSICAL HEALTH: ON AVERAGE, HOW MANY MINUTES DO YOU ENGAGE IN EXERCISE AT THIS LEVEL?: 60 MIN

## 2024-04-02 ASSESSMENT — SOCIAL DETERMINANTS OF HEALTH (SDOH)
WITHIN THE LAST YEAR, HAVE YOU BEEN HUMILIATED OR EMOTIONALLY ABUSED IN OTHER WAYS BY YOUR PARTNER OR EX-PARTNER?: NO
WITHIN THE LAST YEAR, HAVE YOU BEEN KICKED, HIT, SLAPPED, OR OTHERWISE PHYSICALLY HURT BY YOUR PARTNER OR EX-PARTNER?: NO
HOW HARD IS IT FOR YOU TO PAY FOR THE VERY BASICS LIKE FOOD, HOUSING, MEDICAL CARE, AND HEATING?: NOT VERY HARD
HOW OFTEN DO YOU ATTENT MEETINGS OF THE CLUB OR ORGANIZATION YOU BELONG TO?: PATIENT DECLINED
DO YOU BELONG TO ANY CLUBS OR ORGANIZATIONS SUCH AS CHURCH GROUPS UNIONS, FRATERNAL OR ATHLETIC GROUPS, OR SCHOOL GROUPS?: PATIENT DECLINED
ARE YOU MARRIED, WIDOWED, DIVORCED, SEPARATED, NEVER MARRIED, OR LIVING WITH A PARTNER?: PATIENT DECLINED
WITHIN THE LAST YEAR, HAVE YOU BEEN AFRAID OF YOUR PARTNER OR EX-PARTNER?: NO
WITHIN THE LAST YEAR, HAVE TO BEEN RAPED OR FORCED TO HAVE ANY KIND OF SEXUAL ACTIVITY BY YOUR PARTNER OR EX-PARTNER?: NO
HOW OFTEN DO YOU ATTEND CHURCH OR RELIGIOUS SERVICES?: MORE THAN 4 TIMES PER YEAR
IN A TYPICAL WEEK, HOW MANY TIMES DO YOU TALK ON THE PHONE WITH FAMILY, FRIENDS, OR NEIGHBORS?: MORE THAN THREE TIMES A WEEK
HOW OFTEN DO YOU GET TOGETHER WITH FRIENDS OR RELATIVES?: MORE THAN THREE TIMES A WEEK
IN THE PAST 12 MONTHS, HAS THE ELECTRIC, GAS, OIL, OR WATER COMPANY THREATENED TO SHUT OFF SERVICE IN YOUR HOME?: NO

## 2024-04-02 ASSESSMENT — COLUMBIA-SUICIDE SEVERITY RATING SCALE - C-SSRS
1. IN THE PAST MONTH, HAVE YOU WISHED YOU WERE DEAD OR WISHED YOU COULD GO TO SLEEP AND NOT WAKE UP?: NO
2. HAVE YOU ACTUALLY HAD ANY THOUGHTS OF KILLING YOURSELF?: NO
6. HAVE YOU EVER DONE ANYTHING, STARTED TO DO ANYTHING, OR PREPARED TO DO ANYTHING TO END YOUR LIFE?: NO

## 2024-04-02 ASSESSMENT — ANXIETY QUESTIONNAIRES
7. FEELING AFRAID AS IF SOMETHING AWFUL MIGHT HAPPEN: NOT AT ALL
IF YOU CHECKED OFF ANY PROBLEMS ON THIS QUESTIONNAIRE, HOW DIFFICULT HAVE THESE PROBLEMS MADE IT FOR YOU TO DO YOUR WORK, TAKE CARE OF THINGS AT HOME, OR GET ALONG WITH OTHER PEOPLE: NOT DIFFICULT AT ALL
4. TROUBLE RELAXING: NOT AT ALL
5. BEING SO RESTLESS THAT IT IS HARD TO SIT STILL: NOT AT ALL
1. FEELING NERVOUS, ANXIOUS, OR ON EDGE: NOT AT ALL
GAD7 TOTAL SCORE: 0
3. WORRYING TOO MUCH ABOUT DIFFERENT THINGS: NOT AT ALL
6. BECOMING EASILY ANNOYED OR IRRITABLE: NOT AT ALL
2. NOT BEING ABLE TO STOP OR CONTROL WORRYING: NOT AT ALL

## 2024-04-02 ASSESSMENT — ENCOUNTER SYMPTOMS
AGITATION: 0
MYALGIAS: 0
ACTIVITY CHANGE: 0
SINUS PRESSURE: 0
PALPITATIONS: 0
DIARRHEA: 0
SORE THROAT: 0
DEPRESSION: 0
OCCASIONAL FEELINGS OF UNSTEADINESS: 0
CHILLS: 0
CHEST TIGHTNESS: 0
NAUSEA: 0
LOSS OF SENSATION IN FEET: 0
SHORTNESS OF BREATH: 0
WEAKNESS: 0

## 2024-04-02 ASSESSMENT — LIFESTYLE VARIABLES
HOW OFTEN DO YOU HAVE SIX OR MORE DRINKS ON ONE OCCASION: NEVER
HOW OFTEN DO YOU HAVE A DRINK CONTAINING ALCOHOL: NEVER
SKIP TO QUESTIONS 9-10: 1
HOW MANY STANDARD DRINKS CONTAINING ALCOHOL DO YOU HAVE ON A TYPICAL DAY: 1 OR 2
AUDIT-C TOTAL SCORE: 0

## 2024-04-02 ASSESSMENT — PAIN SCALES - GENERAL: PAINLEVEL: 0-NO PAIN

## 2024-04-02 ASSESSMENT — PATIENT HEALTH QUESTIONNAIRE - PHQ9
SUM OF ALL RESPONSES TO PHQ9 QUESTIONS 1 & 2: 0
1. LITTLE INTEREST OR PLEASURE IN DOING THINGS: NOT AT ALL
2. FEELING DOWN, DEPRESSED OR HOPELESS: NOT AT ALL

## 2024-04-02 NOTE — PROGRESS NOTES
Primary care office Note- Dr. Mak Estrella      Name: Mele Mayes, Age: 43 y.o., Gender: female, MRN: [id number]   Pharmacy:   [id number] [place] [address]  [id number] [place] [id number]  Phone: [phone] Fax: [phone]     PCP: Mak Estrella        Subjective:     Chief Complaint   Patient presents with    multiple concens     Right side pain ,coughing up  , blood       HPI:   Mele Mayes is a 43 y.o. female that presents for a history of coughing up blood.  She has chronic sinusitis but also has bloody noses.  She denies any upset stomach or heartburn.  She does report chronic sinus congestion particularly bad on the left-hand side.  She is seeing an ENT doctor in the past but was not happy with his care.  Therefore she never went back.    ROS  Review of Systems   Constitutional:  Negative for activity change and chills.   HENT:  Negative for congestion, sinus pressure and sore throat.    Respiratory:  Negative for chest tightness and shortness of breath.    Cardiovascular:  Negative for chest pain and palpitations.   Gastrointestinal:  Negative for diarrhea and nausea.   Musculoskeletal:  Negative for myalgias.   Neurological:  Negative for weakness.   Psychiatric/Behavioral:  Negative for agitation and behavioral problems.         Medical History      PMH:    has a past medical history of Other specified diabetes mellitus without complications (CMS/HCC), Personal history of other diseases of the circulatory system, and Personal history of other medical treatment (05/25/2016).   Allergies:   No Known Allergies   Surgical Hx:   Past Surgical History:   Procedure Laterality Date    HYSTERECTOMY        Social HX:   Social History     Tobacco Use    Smoking status: Never    Smokeless tobacco: Never   Vaping Use    Vaping Use: Never used   Substance Use Topics    Alcohol use: Yes    Drug use: Not Currently        MEDS:   Current Outpatient Medications   Medication  Instructions    amLODIPine (NORVASC) 10 mg, oral, Daily    amoxicillin-pot clavulanate (Augmentin) 500-125 mg tablet 500 mg, oral, 2 times daily    hydrOXYzine HCL (ATARAX) 25 mg, oral, Once        Objective Data     Objective:   Visit Vitals  /86   Pulse 82   Temp 36.8 °C (98.2 °F)   Resp 18        Physical Examination:   Physical Exam  Constitutional:       Appearance: Normal appearance.   HENT:      Head: Normocephalic and atraumatic.      Nose: Nose normal.      Mouth/Throat:      Mouth: Mucous membranes are moist.   Eyes:      Extraocular Movements: Extraocular movements intact.      Pupils: Pupils are equal, round, and reactive to light.   Cardiovascular:      Rate and Rhythm: Normal rate and regular rhythm.   Pulmonary:      Effort: No respiratory distress.      Breath sounds: No wheezing.   Abdominal:      General: Bowel sounds are normal.      Palpations: Abdomen is soft.   Neurological:      General: No focal deficit present.        Last Labs:   CBC:   WBC   Date Value Ref Range Status   08/22/2023 6.3 4.4 - 11.3 x10E9/L Final   08/22/2023 CANCELED       Comment:     Result canceled by the ancillary.   04/05/2022 4.7 4.5 - 11.0 K/UL Final     Hemoglobin   Date Value Ref Range Status   08/22/2023 12.6 12.0 - 16.0 g/dL Final   08/22/2023 CANCELED       Comment:     Result canceled by the ancillary.   04/05/2022 13.3 12.0 - 15.0 GM/DL Final     MCV   Date Value Ref Range Status   08/22/2023 87 80 - 100 fL Final   08/22/2023 CANCELED       Comment:     Result canceled by the ancillary.   04/05/2022 88.9 80 - 100 FL Final     Platelets   Date Value Ref Range Status   08/22/2023 279 150 - 450 x10E9/L Final   08/22/2023 CANCELED       Comment:     Result canceled by the ancillary.   04/05/2022 284 150 - 450 K/UL Final      CMP:   Sodium   Date Value Ref Range Status   08/22/2023 134 (L) 136 - 145 mmol/L Final   08/22/2023 CANCELED       Comment:     Result canceled by the ancillary.   10/31/2022 140 136 -  145 mmol/L Final     Potassium   Date Value Ref Range Status   08/22/2023 3.9 3.5 - 5.3 mmol/L Final   08/22/2023 CANCELED       Comment:     Result canceled by the ancillary.   10/31/2022 3.6 3.5 - 5.3 mmol/L Final     Chloride   Date Value Ref Range Status   08/22/2023 102 98 - 107 mmol/L Final   08/22/2023 CANCELED       Comment:     Result canceled by the ancillary.   10/31/2022 104 98 - 107 mmol/L Final     Urea Nitrogen   Date Value Ref Range Status   08/22/2023 14 6 - 23 mg/dL Final   08/22/2023 CANCELED       Comment:     Result canceled by the ancillary.   10/31/2022 10 6 - 23 mg/dL Final     Creatinine   Date Value Ref Range Status   08/22/2023 0.68 0.50 - 1.05 mg/dL Final   08/22/2023 CANCELED       Comment:     Result canceled by the ancillary.   10/31/2022 0.74 0.50 - 1.05 mg/dL Final     ESTIMATED GFR   Date Value Ref Range Status   04/05/2022 95 mL/min/1.73 m2 Final     Comment:     CALCULATIONS OF ESTIMATED GFR ARE PERFORMED USING THE 2021 CKD-EPI   STUDY REFIT EQUATION WITHOUT THE RACE VARIABLE FOR THE IDMS-TRACEABLE   CREATININE METHODS.  https://jasn.asnjournals.org/content/early/2021/09/22/ASN.4142408567  Performed at [address] [address]     03/18/2021 84 mL/min/1.73 m2 Final     Comment:     GFR ml/min/1.73m2   Stage  ------------------   -----     90               1     60-89            2     30-59            3     15-29            4     <15              5  For -Americans, multiply EGFR result by 1.210  Calculation not validated for patients under 18 years of age.  Performed at [address] [address]        A1c:   Hemoglobin A1C   Date Value Ref Range Status   12/10/2019 5.8 % Final     Comment:          Diagnosis of Diabetes-Adults   Non-Diabetic: < or = 5.6%   Increased risk for developing diabetes: 5.7-6.4%   Diagnostic of diabetes: > or = 6.5%  .       Monitoring of Diabetes                Age (y)     Therapeutic Goal (%)   Adults:           >18           <7.0   Pediatrics:    13-18           <7.5                   7-12           <8.0                   0- 6            7.5-8.5   American Diabetes Association. Diabetes Care 33(S1), Jan 2010.          Other labs;   Vit D:   Vitamin D, 25-Hydroxy   Date Value Ref Range Status   04/05/2022 41 31 - 100 ng/mL Final     Comment:     Performed at [address] [address]   03/18/2021 37 31 - 100 ng/mL Final     Comment:     Performed at [address] [address]      TSH:  Thyroid Stimulating Hormone   Date Value Ref Range Status   04/05/2022 2.22 0.27 - 4.20 MIU/L Final     Comment:     Performed at [address] [address]        Assessment and Plan     Problem List Items Addressed This Visit       Vitamin D deficiency    Relevant Orders    Vitamin D 25-Hydroxy,Total (for eval of Vitamin D levels)    High gamma glutamyl transferase (GGT)    Relevant Orders    Comprehensive Metabolic Panel    Controlled type 2 diabetes mellitus without complication, without long-term current use of insulin (CMS/[facility])    Relevant Orders    Hemoglobin A1C    Albumin , Urine Random    Hyperlipidemia, mixed    Relevant Orders    Lipid Panel    Iron deficiency anemia due to chronic blood loss    Relevant Orders    CBC    Sinusitis chronic, ethmoidal - Primary    Relevant Medications    amoxicillin-pot clavulanate (Augmentin) 500-125 mg tablet    Other Relevant Orders    Referral to ENT      She had her mammogram.  I will check a CBC to make sure she did not lose in excess amount of blood.  We will refer her to another ear nose and throat specialist.  We will order care gaps as indicated.    Mak Estrella MD

## 2024-04-03 LAB
25(OH)D3 SERPL-MCNC: 71 NG/ML (ref 30–100)
ALBUMIN SERPL BCP-MCNC: 4.3 G/DL (ref 3.4–5)
ALP SERPL-CCNC: 134 U/L (ref 33–110)
ALT SERPL W P-5'-P-CCNC: 31 U/L (ref 7–45)
ANION GAP SERPL CALC-SCNC: 12 MMOL/L (ref 10–20)
AST SERPL W P-5'-P-CCNC: 27 U/L (ref 9–39)
BILIRUB SERPL-MCNC: 0.5 MG/DL (ref 0–1.2)
BUN SERPL-MCNC: 12 MG/DL (ref 6–23)
CALCIUM SERPL-MCNC: 10.3 MG/DL (ref 8.6–10.6)
CHLORIDE SERPL-SCNC: 102 MMOL/L (ref 98–107)
CHOLEST SERPL-MCNC: 210 MG/DL (ref 0–199)
CHOLESTEROL/HDL RATIO: 3.2
CO2 SERPL-SCNC: 29 MMOL/L (ref 21–32)
CREAT SERPL-MCNC: 0.79 MG/DL (ref 0.5–1.05)
CREAT UR-MCNC: 163.2 MG/DL (ref 20–320)
EGFRCR SERPLBLD CKD-EPI 2021: >90 ML/MIN/1.73M*2
ERYTHROCYTE [DISTWIDTH] IN BLOOD BY AUTOMATED COUNT: 13.6 % (ref 11.5–14.5)
EST. AVERAGE GLUCOSE BLD GHB EST-MCNC: 123 MG/DL
GLUCOSE SERPL-MCNC: 86 MG/DL (ref 74–99)
HBA1C MFR BLD: 5.9 %
HCT VFR BLD AUTO: 41.1 % (ref 36–46)
HDLC SERPL-MCNC: 64.7 MG/DL
HGB BLD-MCNC: 13.3 G/DL (ref 12–16)
LDLC SERPL CALC-MCNC: 132 MG/DL
MCH RBC QN AUTO: 29.6 PG (ref 26–34)
MCHC RBC AUTO-ENTMCNC: 32.4 G/DL (ref 32–36)
MCV RBC AUTO: 92 FL (ref 80–100)
MICROALBUMIN UR-MCNC: <7 MG/L
MICROALBUMIN/CREAT UR: NORMAL MG/G{CREAT}
NON HDL CHOLESTEROL: 145 MG/DL (ref 0–149)
NRBC BLD-RTO: 0 /100 WBCS (ref 0–0)
PLATELET # BLD AUTO: 300 X10*3/UL (ref 150–450)
POTASSIUM SERPL-SCNC: 3.7 MMOL/L (ref 3.5–5.3)
PROT SERPL-MCNC: 7.8 G/DL (ref 6.4–8.2)
RBC # BLD AUTO: 4.49 X10*6/UL (ref 4–5.2)
SODIUM SERPL-SCNC: 139 MMOL/L (ref 136–145)
TRIGL SERPL-MCNC: 66 MG/DL (ref 0–149)
VLDL: 13 MG/DL (ref 0–40)
WBC # BLD AUTO: 5.3 X10*3/UL (ref 4.4–11.3)

## 2024-04-24 ENCOUNTER — OFFICE VISIT (OUTPATIENT)
Dept: OTOLARYNGOLOGY | Facility: CLINIC | Age: 44
End: 2024-04-24
Payer: COMMERCIAL

## 2024-04-24 VITALS — BODY MASS INDEX: 26.05 KG/M2 | WEIGHT: 132.7 LBS | HEIGHT: 60 IN

## 2024-04-24 DIAGNOSIS — J34.89 NASAL AND SINUS DISCHARGE: ICD-10-CM

## 2024-04-24 DIAGNOSIS — R04.0 EPISTAXIS: ICD-10-CM

## 2024-04-24 DIAGNOSIS — J32.2 SINUSITIS CHRONIC, ETHMOIDAL: ICD-10-CM

## 2024-04-24 DIAGNOSIS — J32.8 OTHER CHRONIC SINUSITIS: Primary | ICD-10-CM

## 2024-04-24 PROCEDURE — 3044F HG A1C LEVEL LT 7.0%: CPT | Performed by: NURSE PRACTITIONER

## 2024-04-24 PROCEDURE — 3062F POS MACROALBUMINURIA REV: CPT | Performed by: NURSE PRACTITIONER

## 2024-04-24 PROCEDURE — 1036F TOBACCO NON-USER: CPT | Performed by: NURSE PRACTITIONER

## 2024-04-24 PROCEDURE — 3050F LDL-C >= 130 MG/DL: CPT | Performed by: NURSE PRACTITIONER

## 2024-04-24 PROCEDURE — 99214 OFFICE O/P EST MOD 30 MIN: CPT | Performed by: NURSE PRACTITIONER

## 2024-04-24 RX ORDER — DOXYCYCLINE 100 MG/1
100 TABLET ORAL 2 TIMES DAILY
Qty: 28 TABLET | Refills: 0 | Status: SHIPPED | OUTPATIENT
Start: 2024-04-24 | End: 2024-05-08

## 2024-04-24 ASSESSMENT — PATIENT HEALTH QUESTIONNAIRE - PHQ9
SUM OF ALL RESPONSES TO PHQ9 QUESTIONS 1 AND 2: 0
2. FEELING DOWN, DEPRESSED OR HOPELESS: NOT AT ALL
1. LITTLE INTEREST OR PLEASURE IN DOING THINGS: NOT AT ALL

## 2024-04-24 NOTE — PATIENT INSTRUCTIONS
Today you were evaluated by Fiorella Hall CNP.    Please follow-up in 4 weeks or sooner if needed. If you have any questions or concerns, please contact my office at [phone].     You will be started on Doxycycline, twice daily, for 2 full weeks.    As discussed, use of doxycycline can cause a skin sensitivity reaction with the sun. Avoid sun exposure while taking this medication. Also, do not take this medication with dairy. Take a daily probiotic.     Please contact our scheduling and  service at [phone]. They will work with you to get your test, imaging, or other appointments scheduled that might have been ordered.     A CT scan was ordered today. This is a non-contrast CT scan. Please call the  to create an appointment for this scan. However, we do not want you to obtain the imaging until you have completed the full course of the antibiotic medication prescribed. We would also like for you to obtain this CT scan at a [facility] facility.    - Begin Mupirocin ointment 3 times daily FOR 2-4 WEEKS as directed. Use the pads of your fingers to apply the ointment and then sniff back gently. Do not use a cue tip or finger nail to place the ointment as this can cause further trauma. IF THE PRESCRIBED OINTMENT IS TOO EXPENSIVE THEN JUST USE OVER THE COUNTER BACITRACIN OR TRIPLE ANTIBIOTIC OINTMENT.

## 2024-04-24 NOTE — PROGRESS NOTES
Subjective   Patient ID: Mele Mayes is a 43 y.o. female who presents for New Patient Visit (Blood clots coming from left nostril having trouble breathing out of right nostril.).    HPI  4/24/24- Presents since being lost to follow up in 2020. She reports seeing multiple ENTs through  and [facility] since and none of them have been able to give her any solutions to her problems. Today she reports having frequent blood clots that have been coming from her left nostril. This began a few months ago. She notices when she blows her nose there is bloody drainage on her tissue most days of the week. However, she denies any full nose bleeding episodes. She states she has not been on any nasal sprays, rinses, or ointments for about one year because she did not feel any of them worked for her. She has also tried saline spray as well as a humidifier but these do not seem to help either. Right nasal blockage that has been more bothersome for at least one year. Denies use of Afrin or other nasal decongestants. Septoplasty and turbinate reduction by Dr. Mahajan in 2021. She states this only gave her minimal relief in her nasal blockage. She reports she has been tested for allergies in the past with a blood test that was negative. However, she states she feels it was not accurate because she sneezes all the time year round. She denies any discolored A/P nasal drainage, facial pain / pressure, constant throat clearing, loss of taste or smell.     I have also reviewed prior note from Dr. Mak Estrella dated 4/2/24 and this is contributing to my history and assessment.      Review of Systems  Review of systems is negative for constitutional, ophthalmological, cardiac, pulmonary, renal, gastrointestinal, musculoskeletal, mental health, endocrine, or neurologic disorders (except as listed in the HPI, PMH, and Problem List).     Objective   Physical Exam  CONSTITUTIONAL: Vital signs reviewed. Patient appears well developed  and well nourished.   GENERAL: this is a healthy appearing female who appears stated age. The patient is alert and appropriately verbally conversant without hoarseness. This patient is in no apparent distress.   FACE: The face was inspected and no cutaneous masses or lesions were visualized. There was no erythema or edema noted. Facial movement was symmetric. No skin lesions were detected. There was no sinus tenderness elicited. TMJ crepitus absent.   EYES: Extra-ocular muscle function was intact. No nystagmus was observed. Pupils were equal.   CRANIAL NERVES: Cranial nerves II, III, IV, and VI were noted to be intact via extra-ocular muscle movement testing. Cranial nerve VII noted to be intact and symmetric by facial movement. Cranial nerves IX and X noted to be intact by gag reflex and palatal movement. Cranial nerve XII noted to be intact by active and symmetric tongue movement.   NOSE: Examination of the nose revealed the nasal dorsum to be midline. Intranasal exam reveals the septum is midline. There is gentle oozing from the bilateral nasal septum. The inferior turbinates were hypertrophic. No masses, polyps, mucopus, or other lesion on anterior rhinoscopy. See below procedure note as applicable for further exam.  ORAL CAVITY: Examination of the oral cavity revealed no mass lesions nor infection. The palate was noted to be intact. The tongue exhibited normal mobility. Mucosa was moist without lesion. The lips were free of lesion. Gums were free of inflammation. Dentition: normal without obvious infection or inflammation  OROPHARYNX: The oral pharynx was free of mass lesion or mucosal abnormality. The palate was noted to be without lesion. The uvula was normal appearing. The tonsils were Absent.  EARS: Examination of the ears revealed that the auricles were normally formed with no lesions. The external auditory canals were normal. The tympanic membranes were intact.  There is no inflammation visualized.    NECK: Visualization and palpation of the neck revealed no mass lesions. No skin lesions or inflammatory processes were detected. The cervical musculature was normal to palpation.   CERVICAL LYMPHATICS: There were no palpable lymph nodes in the posterior triangle, submandibular triangle, jugulodigastric region, or central neck.  RESPIRATORY: Normal inspiration and expiration and chest wall expansion, no use of accessory muscles to breathe, no stridor.  NEUROLOGICAL: Patient is ambulatory without assist. Mentation is clear. Answering questions appropriately.     Assessment/Plan     Assessment:  39 year old female with symptoms and clinical findings consistent with chronic rhinitis, inferior turbinate hypertrophy and S-shaped septal deviation.   12/11/20- Patient has continued issues with nasal obstruction despite multiple nasal sprays. Nasal obstruction secondary to position of the S-shaped septal deviation and inferior turbinate hypertrophy.   4/24/24 - Bloody drainage from the right nostril, continued left nasal obstruction despite various interventions. Has tried multiple nasal sprays, septoplasty and ITR by Dr. Mahajan in 2021. Previous allergy testing reported negative. No obvious infection on scope exam. Will evaluate for CRS with CT sinus since symptoms persist despite maximal medical therapy. Rx Doxy X 14 days prior to CT.         Plan:  1. Nasal endoscopy: Findings: inferior turbinate hypertrophy. Dry nasal mucosa L<R.   I personally reviewed the patients CT scan images and results. I discussed the results personally with the patient. The following findings were discussed: 8/11/16: CT Neck: S-shaped nasal septal deviation, inferior turbinate hypertrophy. No mucosal thickening throughout the visualized sinuses.   I discussed the findings the patient and offered reassurance and counseling.  We agreed to proceed with therapeutic measures to address the issues noted above.   2. Rx Doxycycline BID X 14 days to  be completed prior to obtaining CT sinus. She was advised to discontinue for any adverse effects.   3. Will obtain CT sinus s/p antibiotic therapy to evaluate for CRS due to persistent sinonasal symptoms despite maximal medical therapy.   4. Patient was instructed to use Mupirocin ointment 3 times daily. She was instructed on appropriate use of this medication today.   5. Patient will follow-up in 8 weeks to assess for benefit of therapies and for further management.  All questions were answered and patient agrees with established plan of care.

## 2024-05-14 ENCOUNTER — HOSPITAL ENCOUNTER (OUTPATIENT)
Dept: RADIOLOGY | Facility: CLINIC | Age: 44
End: 2024-05-14
Payer: COMMERCIAL

## 2024-05-15 ENCOUNTER — HOSPITAL ENCOUNTER (OUTPATIENT)
Dept: RADIOLOGY | Facility: CLINIC | Age: 44
Discharge: HOME | End: 2024-05-15
Payer: COMMERCIAL

## 2024-05-15 DIAGNOSIS — J32.8 OTHER CHRONIC SINUSITIS: ICD-10-CM

## 2024-05-15 PROCEDURE — 70486 CT MAXILLOFACIAL W/O DYE: CPT

## 2024-05-21 ENCOUNTER — OFFICE VISIT (OUTPATIENT)
Dept: OTOLARYNGOLOGY | Facility: CLINIC | Age: 44
End: 2024-05-21
Payer: COMMERCIAL

## 2024-05-21 VITALS — WEIGHT: 134 LBS | HEIGHT: 61 IN | BODY MASS INDEX: 25.3 KG/M2 | TEMPERATURE: 97.3 F

## 2024-05-21 DIAGNOSIS — J34.3 HYPERTROPHY OF INFERIOR NASAL TURBINATE: ICD-10-CM

## 2024-05-21 DIAGNOSIS — J34.89 NASAL AND SINUS DISCHARGE: Primary | ICD-10-CM

## 2024-05-21 PROCEDURE — 1036F TOBACCO NON-USER: CPT | Performed by: NURSE PRACTITIONER

## 2024-05-21 PROCEDURE — 3044F HG A1C LEVEL LT 7.0%: CPT | Performed by: NURSE PRACTITIONER

## 2024-05-21 PROCEDURE — 3062F POS MACROALBUMINURIA REV: CPT | Performed by: NURSE PRACTITIONER

## 2024-05-21 PROCEDURE — 99213 OFFICE O/P EST LOW 20 MIN: CPT | Performed by: NURSE PRACTITIONER

## 2024-05-21 PROCEDURE — 3050F LDL-C >= 130 MG/DL: CPT | Performed by: NURSE PRACTITIONER

## 2024-05-21 ASSESSMENT — PATIENT HEALTH QUESTIONNAIRE - PHQ9
SUM OF ALL RESPONSES TO PHQ9 QUESTIONS 1 AND 2: 0
1. LITTLE INTEREST OR PLEASURE IN DOING THINGS: NOT AT ALL
2. FEELING DOWN, DEPRESSED OR HOPELESS: NOT AT ALL

## 2024-05-21 NOTE — PATIENT INSTRUCTIONS
Today you were evaluated by Fiorella Hall CNP.    Please follow-up in 7 weeks or sooner if needed. If you have any questions or concerns, please contact my office at [phone].     Please see Allergy. This referral was placed today. Please discontinue all antihistamines 7 days prior to this appointment.  Please contact our scheduling and  service at [phone]. They will work with you to get your test, imaging, or other appointments scheduled that might have been ordered.

## 2024-05-21 NOTE — PROGRESS NOTES
Subjective   Patient ID: Mele Mayes is a 43 y.o. female who presents for Follow-up.    HPI  5/21/24- Patient presents for follow-up. She notes that she continues with nasal obstruction and drainage (bilateral), yellow drainage. She had no improvement with antibiotic therapy. She is not taking any nasal sprays, she finds them irritating to her nose. She continues to use ayr saline gel.     I personally reviewed the patients CT scan images and results. I discussed the results personally with the patient. The following findings were discussed: 5/15/24: CT Sinus: Septum midline. Inferior turbinate hypertrophy. Paranasal sinuses are clear.       Review of Systems  Review of systems is negative for constitutional, ophthalmological, cardiac, pulmonary, renal, gastrointestinal, musculoskeletal, mental health, endocrine, or neurologic disorders (except as listed in the HPI, PMH, and Problem List).     Objective   Physical Exam  CONSTITUTIONAL: Patient appears well developed and well nourished.   GENERAL: this is a healthy appearing female who appears stated age. The patient is alert and appropriately verbally conversant without hoarseness. This patient is in no apparent distress.   FACE: The face was inspected and no cutaneous masses or lesions were visualized. There was no erythema or edema noted. Facial movement was symmetric. No skin lesions were detected.   EYES: Extra-ocular muscle function was intact. No nystagmus was observed. Pupils were equal.   NOSE: Examination of the nose revealed the nasal dorsum to be midline. Intranasal exam reveals the septum is midline. There is gentle oozing from the bilateral nasal septum. The inferior turbinates were hypertrophic. No masses, polyps, mucopus, or other lesion on anterior rhinoscopy. See below procedure note as applicable for further exam.  RESPIRATORY: Normal inspiration and expiration and chest wall expansion, no use of accessory muscles to breathe, no  stridor.  NEUROLOGICAL: Patient is ambulatory without assist. Mentation is clear. Answering questions appropriately.     Assessment/Plan     Assessment:  39 year old female with symptoms and clinical findings consistent with chronic rhinitis, inferior turbinate hypertrophy and S-shaped septal deviation.   12/11/20- Patient has continued issues with nasal obstruction despite multiple nasal sprays. Nasal obstruction secondary to position of the S-shaped septal deviation and inferior turbinate hypertrophy.   4/24/24 - Bloody drainage from the right nostril, continued left nasal obstruction despite various interventions. Has tried multiple nasal sprays, septoplasty and ITR by Dr. Mahajan in 2021. Previous allergy testing reported negative. No obvious infection on scope exam. Will evaluate for CRS with CT sinus since symptoms persist despite maximal medical therapy. Rx Doxy X 14 days prior to CT.   5/21/24- Inferior turbinate hypertrophy on scan. Sinuses clear. Referred to allergy.         Plan:  1. Anterior exam: Findings: inferior turbinate hypertrophy.   2. I personally reviewed the patients CT scan images and results. I discussed the results personally with the patient. The following findings were discussed: 5/15/24: CT Sinus: Septum midline. Inferior turbinate hypertrophy. Paranasal sinuses are clear.   I discussed the findings the patient and offered reassurance and counseling.  We agreed to proceed with therapeutic measures to address the issues noted above.   3. Patient was instructed to continue ayr saline gel.  4. With returned inferior turbinate hypertrophy, we discussed another turbinate reduction, which she is not interested in. We also discussed visiting allergy for formal skin testing.   5. Patient will follow-up in 8 weeks to assess for benefit of therapies and for further management.  All questions were answered and patient agrees with established plan of care.

## 2024-05-22 ENCOUNTER — TELEPHONE (OUTPATIENT)
Dept: OBSTETRICS AND GYNECOLOGY | Facility: CLINIC | Age: 44
End: 2024-05-22

## 2024-05-22 ENCOUNTER — APPOINTMENT (OUTPATIENT)
Dept: OTOLARYNGOLOGY | Facility: CLINIC | Age: 44
End: 2024-05-22
Payer: COMMERCIAL

## 2024-06-20 ENCOUNTER — TELEPHONE (OUTPATIENT)
Dept: OTOLARYNGOLOGY | Facility: CLINIC | Age: 44
End: 2024-06-20
Payer: COMMERCIAL

## 2024-06-20 DIAGNOSIS — J34.89 NASAL AND SINUS DISCHARGE: ICD-10-CM

## 2024-06-20 DIAGNOSIS — J32.8 OTHER CHRONIC SINUSITIS: Primary | ICD-10-CM

## 2024-06-20 RX ORDER — FLUTICASONE PROPIONATE 93 UG/1
SPRAY, METERED NASAL
Qty: 16 ML | Refills: 11 | Status: SHIPPED | OUTPATIENT
Start: 2024-06-20

## 2024-06-20 NOTE — TELEPHONE ENCOUNTER
Patient calling with concerns of continued nasal obstruction despite flonase use. Rec. Increase to Xhance. Will attempt to cover with pharmacy. Rx. Sent.

## 2024-07-09 ENCOUNTER — APPOINTMENT (OUTPATIENT)
Dept: OBSTETRICS AND GYNECOLOGY | Facility: CLINIC | Age: 44
End: 2024-07-09
Payer: COMMERCIAL

## 2024-07-09 ENCOUNTER — LAB (OUTPATIENT)
Dept: LAB | Facility: LAB | Age: 44
End: 2024-07-09
Payer: COMMERCIAL

## 2024-07-09 VITALS — WEIGHT: 133 LBS | DIASTOLIC BLOOD PRESSURE: 78 MMHG | SYSTOLIC BLOOD PRESSURE: 110 MMHG | BODY MASS INDEX: 25.13 KG/M2

## 2024-07-09 DIAGNOSIS — R23.2 HOT FLASHES: Primary | ICD-10-CM

## 2024-07-09 DIAGNOSIS — R61 NIGHT SWEATS: ICD-10-CM

## 2024-07-09 DIAGNOSIS — R23.2 HOT FLASHES: ICD-10-CM

## 2024-07-09 LAB
EST. AVERAGE GLUCOSE BLD GHB EST-MCNC: 120 MG/DL
FSH SERPL-ACNC: 4 IU/L
HBA1C MFR BLD: 5.8 %
TSH SERPL-ACNC: 2.6 MIU/L (ref 0.44–3.98)

## 2024-07-09 PROCEDURE — 3044F HG A1C LEVEL LT 7.0%: CPT | Performed by: OBSTETRICS & GYNECOLOGY

## 2024-07-09 PROCEDURE — 83036 HEMOGLOBIN GLYCOSYLATED A1C: CPT

## 2024-07-09 PROCEDURE — 84443 ASSAY THYROID STIM HORMONE: CPT

## 2024-07-09 PROCEDURE — 83001 ASSAY OF GONADOTROPIN (FSH): CPT

## 2024-07-09 PROCEDURE — 3050F LDL-C >= 130 MG/DL: CPT | Performed by: OBSTETRICS & GYNECOLOGY

## 2024-07-09 PROCEDURE — 3062F POS MACROALBUMINURIA REV: CPT | Performed by: OBSTETRICS & GYNECOLOGY

## 2024-07-09 PROCEDURE — 36415 COLL VENOUS BLD VENIPUNCTURE: CPT

## 2024-07-09 PROCEDURE — 1036F TOBACCO NON-USER: CPT | Performed by: OBSTETRICS & GYNECOLOGY

## 2024-07-09 PROCEDURE — 3078F DIAST BP <80 MM HG: CPT | Performed by: OBSTETRICS & GYNECOLOGY

## 2024-07-09 PROCEDURE — 3074F SYST BP LT 130 MM HG: CPT | Performed by: OBSTETRICS & GYNECOLOGY

## 2024-07-09 PROCEDURE — 80053 COMPREHEN METABOLIC PANEL: CPT

## 2024-07-09 PROCEDURE — 99214 OFFICE O/P EST MOD 30 MIN: CPT | Performed by: OBSTETRICS & GYNECOLOGY

## 2024-07-09 ASSESSMENT — ENCOUNTER SYMPTOMS
CONSTITUTIONAL NEGATIVE: 0
HEMATOLOGIC/LYMPHATIC NEGATIVE: 0
ENDOCRINE NEGATIVE: 0
MUSCULOSKELETAL NEGATIVE: 0
CARDIOVASCULAR NEGATIVE: 0
GASTROINTESTINAL NEGATIVE: 0
PSYCHIATRIC NEGATIVE: 0
RESPIRATORY NEGATIVE: 0
ALLERGIC/IMMUNOLOGIC NEGATIVE: 0
NEUROLOGICAL NEGATIVE: 0
EYES NEGATIVE: 0

## 2024-07-09 ASSESSMENT — PAIN SCALES - GENERAL: PAINLEVEL: 0-NO PAIN

## 2024-07-09 NOTE — PATIENT INSTRUCTIONS
Thanks for coming in today for follow-up.    Labs are sent today to help evaluate your hot flashes and night sweats.  Results should be available 24 to 48 hours after your blood work has been drawn.  You may call the office and select option #2 to speak with the nurse to obtain the results.    Review the information and ways to decrease menopause symptoms/hot flashes and night sweats.    Return to the office for follow-up and to come up with a plan of care.    Follow-up with your PCP and other healthcare specialist as needed.    Feel free to call the office with any problems, questions or concerns prior to your next scheduled visit.

## 2024-07-09 NOTE — PROGRESS NOTES
43-year-old G0 -American woman presents today for follow-up.  She had a TLH in August 2023 for fibroids with heavy and abnormal vaginal bleeding.    She now reports a several month history of intense hot flashes and night sweats.  She also reports vaginal dryness.    O: WDWN woman in NAD, A&O x3    A/P: Hot flashes and night sweats with vaginal dryness     -  Check TSH, FSH and HA1C     -  CMP, in case wants to use HT     -  HT info given, Adeline info given     -  Uber lube samples given     -  RTC for POC

## 2024-07-10 LAB
ALBUMIN SERPL BCP-MCNC: 4.3 G/DL (ref 3.4–5)
ALP SERPL-CCNC: 147 U/L (ref 33–110)
ALT SERPL W P-5'-P-CCNC: 27 U/L (ref 7–45)
ANION GAP SERPL CALC-SCNC: 12 MMOL/L (ref 10–20)
AST SERPL W P-5'-P-CCNC: 21 U/L (ref 9–39)
BILIRUB SERPL-MCNC: 0.4 MG/DL (ref 0–1.2)
BUN SERPL-MCNC: 11 MG/DL (ref 6–23)
CALCIUM SERPL-MCNC: 9.9 MG/DL (ref 8.6–10.6)
CHLORIDE SERPL-SCNC: 102 MMOL/L (ref 98–107)
CO2 SERPL-SCNC: 27 MMOL/L (ref 21–32)
CREAT SERPL-MCNC: 0.84 MG/DL (ref 0.5–1.05)
EGFRCR SERPLBLD CKD-EPI 2021: 89 ML/MIN/1.73M*2
GLUCOSE SERPL-MCNC: 75 MG/DL (ref 74–99)
POTASSIUM SERPL-SCNC: 4.5 MMOL/L (ref 3.5–5.3)
PROT SERPL-MCNC: 7.6 G/DL (ref 6.4–8.2)
SODIUM SERPL-SCNC: 136 MMOL/L (ref 136–145)

## 2024-08-22 NOTE — PROGRESS NOTES
Mele Mayes female   1980 43 y.o.   [id number]          [facility]  Mele Mayes is a 43 y.o.  AA female RTA  presenting for high risk breast surveillance care. She denies breast biopsy and surgery. In 2023, she had a negative 77 gene CancerNext panel. Family history of breast cancer in mother and maternal grandmother.     Declined endocrine therapy.     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     BREAST IMAGIN2024 bilateral screening mammogram, BI-RADS Category 1.     REPRODUCTIVE HISTORY: menarche age 13, nulliparous, menopause age unknown, extremely dense breast tissue     FAMILY CANCER HISTORY:   Mother: Breast cancer age 40  Maternal grandmother: Breast cancer age 30    REVIEW OF SYSTEMS    Constitutional:  Negative for appetite change, fatigue, fever and unexpected weight change.   HENT:  Negative for ear pain, hearing loss, nosebleeds, sore throat and trouble swallowing.    Eyes:  Negative for discharge, itching and visual disturbance.   Respiratory:  Negative for cough, chest tightness and shortness of breath.    Cardiovascular:  Negative for chest pain, palpitations and leg swelling.   Breast: as indicated in HPI  Gastrointestinal:  Negative for abdominal pain, constipation, diarrhea and nausea.   Endocrine: Negative for cold intolerance and heat intolerance.   Genitourinary:  Negative for dysuria, frequency, hematuria, pelvic pain and vaginal bleeding.   Musculoskeletal:  Negative for arthralgias, back pain, gait problem, joint swelling and myalgias.   Skin:  Negative for color change and rash.   Allergic/Immunologic: Negative for environmental allergies and food allergies.   Neurological:  Negative for dizziness, tremors, speech difficulty, weakness, numbness and headaches.   Hematological:  Does not bruise/bleed easily.   Psychiatric/Behavioral:  Negative for agitation, dysphoric mood and sleep disturbance. The patient is not nervous/anxious.         MEDICATIONS  Current  Outpatient Medications   Medication Instructions    amLODIPine (NORVASC) 10 mg, oral, Daily    fluticasone propionate (Xhance) 93 mcg/actuation aerosol breath activated Spray 1 spray in each nostril twice daily.    hydrOXYzine HCL (ATARAX) 25 mg, oral, Once        ALLERGIES  No Known Allergies     Past Medical History:   Diagnosis Date    Anxiety     Other specified diabetes mellitus without complications (Multi)     Diabetes mellitus of other type without complication    Personal history of other diseases of the circulatory system     History of hypertension    Personal history of other medical treatment 05/25/2016    History of screening mammography      Past Surgical History:   Procedure Laterality Date    HYSTERECTOMY      NASAL SEPTUM SURGERY      TONSILLECTOMY        Family History   Problem Relation Name Age of Onset    Breast cancer Mother  40    Hypertension Father      Diabetes Father      Breast cancer Maternal Grandmother  30          SOCIAL HISTORY      Social History     Tobacco Use    Smoking status: Never     Passive exposure: Never    Smokeless tobacco: Never   Substance Use Topics    Alcohol use: Yes        VITALS  Vitals:    08/27/24 1409   BP: 106/75   Pulse: 100          PHYSICAL EXAM  Patient is alert and oriented x3, with appropriate mood. The gait is steady and hand grasps are equal. Sclera clear. The breasts are nearly symmetrical. The tissue is soft without palpable abnormalities, discrete nodules or masses. The skin and nipples appear normal. There is no cervical, supraclavicular, or axillary lymphadenopathy palpable. Heart rate and rhythm normal, S1 and S2 appreciated. The lungs are clear bilaterally. Abdomen is soft & non-tender.    Physical Exam     IMAGING  BI mammo bilateral screening tomosynthesis 02/27/2024    Narrative  Interpreted By:  Carlos Mason,  STUDY:  BI MAMMO BILATERAL SCREENING TOMOSYNTHESIS;  2/27/2024 1:40 pm    INDICATION:  Screening. Patient has a family history of  breast cancer in her  mother at age 50 and paternal grandmother at age 86.    Density:  The breast tissue is extremely dense, which may limit the  sensitivity of mammography.    No suspicious masses or calcifications are identified.    Impression  No mammographic evidence of malignancy.      BI-RADS Category:  1 Negative.  Recommendation:  Annual Screening.  Recommended Date:  1 Year.  Laterality:  Bilateral.        Time was spent viewing digital images of the radiology testing with the patient. I explained the results in depth, along with suggested explanation for follow up recommendations based on the testing results.          ORDERS  Orders Placed This Encounter   Procedures    BI mammo bilateral screening tomosynthesis     Standing Status:   Future     Standing Expiration Date:   10/27/2025     Order Specific Question:   Is the patient pregnant?     Answer:   No     Order Specific Question:   Reason for exam:     Answer:   clinic screen     Order Specific Question:   Radiologist to Determine Optimal Study     Answer:   Yes     Order Specific Question:   Release result to [employer]     Answer:   Immediate     Order Specific Question:   Is this exam part of a Research Study? If Yes, link this order to the research study     Answer:   No    MR breast bilateral w IV contrast fast screening self pay     Standing Status:   Future     Standing Expiration Date:   8/27/2025     Order Specific Question:   Reason for exam:     Answer:   high risk surveillance care, dense breast tissue, lifetime risk > 20%     Order Specific Question:   Is the patient pregnant or breast feeding?     Answer:   No     Order Specific Question:   Does the patient have a Cochlear Implant, Pacemaker, Defibrillator, Pacing Wire, Brain Aneurysm Clip, Implanted Nerve or Bone Graft Stimulator, Implanted Breast Tissue Expander, Glucose Monitor or Neulasta Device?     Answer:   No     Order Specific Question:   Radiologist to Determine Optimal Study      Answer:   Yes     Order Specific Question:   Release result to [employer]     Answer:   Immediate     Order Specific Question:   Is this exam part of a Research Study? If Yes, link this order to the research study     Answer:   No          ASSESSMENT/PLAN  1. Family history of breast cancer  MR breast bilateral w IV contrast fast screening self pay      2. Breast cancer screening, high risk patient  Clinic Appointment Request Follow Up    Clinic Appointment Request    BI mammo bilateral screening tomosynthesis    MR breast bilateral w IV contrast fast screening self pay               HIGH RISK PLAN  Yearly mammogram with digital breast tomosynthesis  Twice yearly clinical breast examinations  Breast MRI (to schedule call [phone]) - ordered due now   Monthly self breast examinations &/or regular self breast awareness  Vitamin D3 2000 IU/daily (over the counter) unless your PCP recommends you take a specific dose  Exercise 3-4 times per week for 45-60 minutes  Limit alcohol to 3-4 drinks per week if you are menopausal  Eat healthy low-fat diet with lots of vegetable & fruits  Risk models indicate personal risk of breast cancer in the next 5 years and lifetime (age 85-90):  Breast Cancer Risk Assessment Tool (Felicita): 5-year risk 1.2% (average 0.8%), lifetime risk 14.7% (average 9.7%).   Lilo-Tiesha: 5-year risk 2.4% (average 0.8%), lifetime risk 28.3%, (average 10.5%)      Recommended endocrine therapy, patient declined at this time.       You can see your health information, review clinical summaries from office visits & test results online when you follow your health with MY  Chart, a personal health record. To sign up go to [email or web address]. If you need assistance with signing up or trouble getting into your account call [employer] Patient Line 24/7 at [phone].    My office phone number is [phone] if you need to get in touch with me or have additional questions or concerns. Thank you for  choosing [facility] and trusting me as your healthcare provider. I look forward to seeing you again at your next office visit. I am honored to be a provider on your health care team and I remain dedicated to helping you achieve your health goals.    JAISON Sylvester-CNP  [facility]

## 2024-08-27 ENCOUNTER — OFFICE VISIT (OUTPATIENT)
Dept: SURGICAL ONCOLOGY | Facility: CLINIC | Age: 44
End: 2024-08-27
Payer: COMMERCIAL

## 2024-08-27 VITALS
HEART RATE: 100 BPM | SYSTOLIC BLOOD PRESSURE: 106 MMHG | BODY MASS INDEX: 24.56 KG/M2 | WEIGHT: 130 LBS | DIASTOLIC BLOOD PRESSURE: 75 MMHG

## 2024-08-27 DIAGNOSIS — Z12.39 BREAST CANCER SCREENING, HIGH RISK PATIENT: ICD-10-CM

## 2024-08-27 DIAGNOSIS — Z80.3 FAMILY HISTORY OF BREAST CANCER: Primary | ICD-10-CM

## 2024-08-27 PROCEDURE — 3062F POS MACROALBUMINURIA REV: CPT

## 2024-08-27 PROCEDURE — 3050F LDL-C >= 130 MG/DL: CPT

## 2024-08-27 PROCEDURE — 3044F HG A1C LEVEL LT 7.0%: CPT

## 2024-08-27 PROCEDURE — 99214 OFFICE O/P EST MOD 30 MIN: CPT

## 2024-08-27 PROCEDURE — 3074F SYST BP LT 130 MM HG: CPT

## 2024-08-27 PROCEDURE — 3078F DIAST BP <80 MM HG: CPT

## 2024-08-27 ASSESSMENT — PAIN SCALES - GENERAL: PAINLEVEL: 0-NO PAIN

## 2024-08-27 NOTE — PATIENT INSTRUCTIONS
You can see your health information, review clinical summaries from office visits & test results online when you follow your health with MY  Chart, a personal health record. To sign up go to [email or web address]. If you need assistance with signing up or trouble getting into your account call [employer] Patient Line 24/7 at [phone].    My office phone number is [phone] if you need to get in touch with me or have additional questions or concerns. Thank you for choosing [facility] and trusting me as your healthcare provider. I look forward to seeing you again at your next office visit. I am honored to be a provider on your health care team and I remain dedicated to helping you achieve your health goals.

## 2024-08-29 ENCOUNTER — APPOINTMENT (OUTPATIENT)
Dept: SURGICAL ONCOLOGY | Facility: CLINIC | Age: 44
End: 2024-08-29
Payer: COMMERCIAL

## 2024-09-03 DIAGNOSIS — I10 PRIMARY HYPERTENSION: ICD-10-CM

## 2024-09-03 RX ORDER — AMLODIPINE BESYLATE 10 MG/1
10 TABLET ORAL DAILY
Qty: 90 TABLET | Refills: 2 | Status: SHIPPED | OUTPATIENT
Start: 2024-09-03

## 2024-09-03 NOTE — TELEPHONE ENCOUNTER
Pt called in wanted to know if she should stop her bp medication? Good taylor back number [phone]

## 2024-09-09 ENCOUNTER — HOSPITAL ENCOUNTER (OUTPATIENT)
Dept: RADIOLOGY | Facility: HOSPITAL | Age: 44
Discharge: HOME | End: 2024-09-09

## 2024-09-09 DIAGNOSIS — Z12.39 BREAST CANCER SCREENING, HIGH RISK PATIENT: ICD-10-CM

## 2024-09-09 DIAGNOSIS — Z80.3 FAMILY HISTORY OF BREAST CANCER: ICD-10-CM

## 2024-09-09 PROCEDURE — 77047 MRI BREAST C- BILATERAL: CPT

## 2024-09-17 ENCOUNTER — APPOINTMENT (OUTPATIENT)
Dept: ALLERGY | Facility: CLINIC | Age: 44
End: 2024-09-17
Payer: COMMERCIAL

## 2024-09-17 DIAGNOSIS — J32.8 OTHER CHRONIC SINUSITIS: ICD-10-CM

## 2024-09-17 DIAGNOSIS — J34.3 HYPERTROPHY OF INFERIOR NASAL TURBINATE: ICD-10-CM

## 2024-09-17 DIAGNOSIS — J34.89 NASAL AND SINUS DISCHARGE: ICD-10-CM

## 2024-09-17 PROCEDURE — 99204 OFFICE O/P NEW MOD 45 MIN: CPT | Performed by: ALLERGY & IMMUNOLOGY

## 2024-09-17 RX ORDER — FLUTICASONE PROPIONATE 93 UG/1
SPRAY, METERED NASAL
Qty: 16 ML | Refills: 11 | Status: SHIPPED | OUTPATIENT
Start: 2024-09-17

## 2024-09-17 NOTE — PROGRESS NOTES
Subjective   Patient ID:   [id number]   Mele Mayes is a 43 y.o. female who presents for Allergic Rhinitis and Allergy Testing.    Chief Complaint   Patient presents with    Allergic Rhinitis    Allergy Testing          HPI  This patient is here to evaluate for:  Allergic rhinitis and conjunctivitis and history asthma  Started approx 30-40 years ago  Having trouble with breathing through her nose  Sent bt ENT and her PCP    Can only breath out of 1 nostril ; alternates.  Had dns surgery last year but still very congested.   Disturbing sleep, waking her up in panic. Had panic attack last night even.  Occurs when trying to fall asleep.   Started on xhance - per ENT.   But for unclear reasons, it wasn't refilled.   She would like to restart it.     Lack of efficacy with flonase.     She reports redness and itching of the eyes, itching and pressure of the ears, sneezing, nasal congestion, rhinorrhea, mouth breathing, snoring, postnasal drainage, throat clearing, headaches, and facial pressure.  There is intermittent cough but she denies any active asthma symptoms to her knowledge.    Past medical history is positive for hypertension    Family history is positive for environmental allergies.    On social history, she works as a , lives alone, denies tobacco or alcohol usage.  She did have 2 dogs in childhood but no current pets.  There is central air conditioning where she lives.    Review of Systems   All other systems reviewed and are negative.        Objective     LMP 06/15/2023      Physical Exam  Constitutional:       General: She is not in acute distress.     Appearance: Normal appearance. She is not ill-appearing.   HENT:      Head: Normocephalic and atraumatic.      Right Ear: Tympanic membrane, ear canal and external ear normal.      Left Ear: Tympanic membrane, ear canal and external ear normal.      Nose: Nose normal. No congestion or rhinorrhea.      Comments: Bilateral inferior turbinate  hypertrophy and pale, septum unremarkable, no obvious polyps seen on anterior examination.        Mouth/Throat:      Mouth: Mucous membranes are moist.      Pharynx: Oropharynx is clear. No oropharyngeal exudate or posterior oropharyngeal erythema.   Eyes:      General:         Right eye: No discharge.         Left eye: No discharge.      Conjunctiva/sclera: Conjunctivae normal.   Cardiovascular:      Rate and Rhythm: Normal rate and regular rhythm.      Heart sounds: Normal heart sounds. No murmur heard.     No friction rub. No gallop.   Pulmonary:      Effort: Pulmonary effort is normal. No respiratory distress.      Breath sounds: Normal breath sounds. No stridor. No wheezing, rhonchi or rales.   Chest:      Chest wall: No tenderness.   Abdominal:      General: Abdomen is flat.      Palpations: Abdomen is soft.   Musculoskeletal:         General: Normal range of motion.      Cervical back: Normal range of motion and neck supple.   Lymphadenopathy:      Cervical: No cervical adenopathy.   Skin:     General: Skin is warm and dry.      Findings: No erythema, lesion or rash.   Neurological:      General: No focal deficit present.      Mental Status: She is alert. Mental status is at baseline.   Psychiatric:         Mood and Affect: Mood normal.         Behavior: Behavior normal.         Thought Content: Thought content normal.         Judgment: Judgment normal.            Current Outpatient Medications   Medication Sig Dispense Refill    amLODIPine (Norvasc) 10 mg tablet Take 1 tablet (10 mg) by mouth once daily. 90 tablet 2    fluticasone propionate (Xhance) 93 mcg/actuation aerosol breath activated Spray 1 spray in each nostril twice daily. 16 mL 11     No current facility-administered medications for this visit.       Summary of the labs over the past 6 months:    Lab on 07/09/2024   Component Date Value Ref Range Status    Follicle Stimulating Hormone 07/09/2024 4.0  IU/L Final    Thyroid Stimulating Hormone  07/09/2024 2.60  0.44 - 3.98 mIU/L Final    Glucose 07/09/2024 75  74 - 99 mg/dL Final    Sodium 07/09/2024 136  136 - 145 mmol/L Final    Potassium 07/09/2024 4.5  3.5 - 5.3 mmol/L Final    Chloride 07/09/2024 102  98 - 107 mmol/L Final    Bicarbonate 07/09/2024 27  21 - 32 mmol/L Final    Anion Gap 07/09/2024 12  10 - 20 mmol/L Final    Urea Nitrogen 07/09/2024 11  6 - 23 mg/dL Final    Creatinine 07/09/2024 0.84  0.50 - 1.05 mg/dL Final    eGFR 07/09/2024 89  >60 mL/min/1.73m*2 Final    Calcium 07/09/2024 9.9  8.6 - 10.6 mg/dL Final    Albumin 07/09/2024 4.3  3.4 - 5.0 g/dL Final    Alkaline Phosphatase 07/09/2024 147 (H)  33 - 110 U/L Final    Total Protein 07/09/2024 7.6  6.4 - 8.2 g/dL Final    AST 07/09/2024 21  9 - 39 U/L Final    Bilirubin, Total 07/09/2024 0.4  0.0 - 1.2 mg/dL Final    ALT 07/09/2024 27  7 - 45 U/L Final    Hemoglobin A1C 07/09/2024 5.8 (H)  see below % Final    Estimated Average Glucose 07/09/2024 120  Not Established mg/dL Final   Office Visit on 04/02/2024   Component Date Value Ref Range Status    Glucose 04/02/2024 86  74 - 99 mg/dL Final    Sodium 04/02/2024 139  136 - 145 mmol/L Final    Potassium 04/02/2024 3.7  3.5 - 5.3 mmol/L Final    Chloride 04/02/2024 102  98 - 107 mmol/L Final    Bicarbonate 04/02/2024 29  21 - 32 mmol/L Final    Anion Gap 04/02/2024 12  10 - 20 mmol/L Final    Urea Nitrogen 04/02/2024 12  6 - 23 mg/dL Final    Creatinine 04/02/2024 0.79  0.50 - 1.05 mg/dL Final    eGFR 04/02/2024 >90  >60 mL/min/1.73m*2 Final    Calcium 04/02/2024 10.3  8.6 - 10.6 mg/dL Final    Albumin 04/02/2024 4.3  3.4 - 5.0 g/dL Final    Alkaline Phosphatase 04/02/2024 134 (H)  33 - 110 U/L Final    Total Protein 04/02/2024 7.8  6.4 - 8.2 g/dL Final    AST 04/02/2024 27  9 - 39 U/L Final    Bilirubin, Total 04/02/2024 0.5  0.0 - 1.2 mg/dL Final    ALT 04/02/2024 31  7 - 45 U/L Final    Vitamin D, 25-Hydroxy, Total 04/02/2024 71  30 - 100 ng/mL Final    Cholesterol 04/02/2024 210 (H)  0  - 199 mg/dL Final    HDL-Cholesterol 04/02/2024 64.7  mg/dL Final    Cholesterol/HDL Ratio 04/02/2024 3.2   Final    LDL Calculated 04/02/2024 132 (H)  <=99 mg/dL Final    VLDL 04/02/2024 13  0 - 40 mg/dL Final    Triglycerides 04/02/2024 66  0 - 149 mg/dL Final    Non HDL Cholesterol 04/02/2024 145  0 - 149 mg/dL Final    Hemoglobin A1C 04/02/2024 5.9 (H)  see below % Final    Estimated Average Glucose 04/02/2024 123  Not Established mg/dL Final    WBC 04/02/2024 5.3  4.4 - 11.3 x10*3/uL Final    nRBC 04/02/2024 0.0  0.0 - 0.0 /100 WBCs Final    RBC 04/02/2024 4.49  4.00 - 5.20 x10*6/uL Final    Hemoglobin 04/02/2024 13.3  12.0 - 16.0 g/dL Final    Hematocrit 04/02/2024 41.1  36.0 - 46.0 % Final    MCV 04/02/2024 92  80 - 100 fL Final    MCH 04/02/2024 29.6  26.0 - 34.0 pg Final    MCHC 04/02/2024 32.4  32.0 - 36.0 g/dL Final    RDW 04/02/2024 13.6  11.5 - 14.5 % Final    Platelets 04/02/2024 300  150 - 450 x10*3/uL Final    Albumin, Urine Random 04/02/2024 <7.0  Not established mg/L Final    Creatinine, Urine Random 04/02/2024 163.2  20.0 - 320.0 mg/dL Final    Albumin/Creatinine Ratio 04/02/2024    Final       SCRATCH SKIN TESTING:  POSITIVE to dust mites, molds, grass, tree, ragweed and other fall weed pollens, cats, and dogs      Assessment/Plan   Diagnoses and all orders for this visit:  Hypertrophy of inferior nasal turbinate  -     Referral to Allergy  Other chronic sinusitis  -     fluticasone propionate (Xhance) 93 mcg/actuation aerosol breath activated; Spray 1 spray in each nostril twice daily.  Nasal and sinus discharge    We performed allergy testing to help determine the etiology of your symptoms. We discussed the results of the testing. Also, we started to focus on treating your problem and we reviewed the management plan.    We discussed the treatment options for this patient's allergies. I recommended allergy avoidance measures and handouts were given to the patient.  Also, other treatment options  "include medication and, if not improved or there is a desire to limit medication usage, this patient would qualify for allergy immunotherapy.     I recommended allergy avoidance measures for dust mites including encasements for the mattress, box spring, and pillows and reducing humidity.    This patient will return to complete allergy testing with intradermal skin tests. Be sure to be off antihistamines for 3 days.    Will discuss SCIT (Subcutaneous Immunotherapy or \"Allergy Shots\") at the next visit since she has persistent symptoms despite good medications.         Julio Jean MD       "

## 2024-09-18 ENCOUNTER — TELEPHONE (OUTPATIENT)
Dept: SURGICAL ONCOLOGY | Facility: HOSPITAL | Age: 44
End: 2024-09-18
Payer: COMMERCIAL

## 2024-09-18 NOTE — TELEPHONE ENCOUNTER
Result Communication    Resulted Orders   MR breast bilateral wo IV contrast    Narrative    Interpreted By:  Martha Ureña,   STUDY:  BI MR BREAST BILATERAL WO CONTRAST;  9/9/2024 10:41 am      ACCESSION NUMBER(S):  [id number]      ORDERING CLINICIAN:  SANJAY ALY      INDICATION:  Screening      ,Z12.39 Encounter for other screening for malignant neoplasm of  breast,Z80.3 Family history of malignant neoplasm of breast      COMPARISON:  Mammogram dated 02/27/2024      TECHNIQUE:  Localizer and T1 axial fat-suppressed images were obtained. No  intravenous contrast was administered.      FINDINGS:  Density: Extreme fibroglandular tissue.      The study was terminated due to patient claustrophobia.        Impression    Terminated study due to patient claustrophobia. No intravenous  contrast was administered. Incomplete study.      BI-RADS CATEGORY:  BI-RADS Category:  0 Incomplete; Need Additional Imaging Evaluation  Recommendation:  Additional Imaging.  Recommended Date:  Immediate.  Laterality:  Bilateral.      For any future breast imaging appointments, please call [phone] ([phone]).          MACRO:  None          Signed by: Martha Ureña 9/16/2024 2:49 PM  Dictation workstation:   [id number]       12:21 PM      Results were successfully communicated with the patient and they acknowledged their understanding. Patient does not wish to proceed with MRI at this time since she was unaware of being claustrophobic. She wishes to  reconsider at follow up visit in February.

## 2024-09-24 ENCOUNTER — APPOINTMENT (OUTPATIENT)
Dept: PRIMARY CARE | Facility: CLINIC | Age: 44
End: 2024-09-24
Payer: COMMERCIAL

## 2024-09-24 DIAGNOSIS — F41.0 PANIC ATTACKS: ICD-10-CM

## 2024-09-24 DIAGNOSIS — F41.9 ANXIETY: Primary | ICD-10-CM

## 2024-09-24 DIAGNOSIS — I10 PRIMARY HYPERTENSION: ICD-10-CM

## 2024-09-24 PROCEDURE — 99214 OFFICE O/P EST MOD 30 MIN: CPT | Performed by: INTERNAL MEDICINE

## 2024-09-24 PROCEDURE — 3050F LDL-C >= 130 MG/DL: CPT | Performed by: INTERNAL MEDICINE

## 2024-09-24 PROCEDURE — 3044F HG A1C LEVEL LT 7.0%: CPT | Performed by: INTERNAL MEDICINE

## 2024-09-24 PROCEDURE — 3062F POS MACROALBUMINURIA REV: CPT | Performed by: INTERNAL MEDICINE

## 2024-09-24 RX ORDER — CITALOPRAM 20 MG/1
20 TABLET, FILM COATED ORAL DAILY
Start: 2024-09-24 | End: 2024-09-24

## 2024-09-24 RX ORDER — CITALOPRAM 20 MG/1
20 TABLET, FILM COATED ORAL DAILY
Qty: 30 TABLET | Refills: 11 | Status: SHIPPED | OUTPATIENT
Start: 2024-09-24 | End: 2025-09-24

## 2024-09-24 ASSESSMENT — ENCOUNTER SYMPTOMS
AGITATION: 1
CHILLS: 0
CHEST TIGHTNESS: 0
PALPITATIONS: 0
SHORTNESS OF BREATH: 0
NAUSEA: 0
DIARRHEA: 0
MYALGIAS: 0
WEAKNESS: 0
ACTIVITY CHANGE: 0

## 2024-09-24 NOTE — ASSESSMENT & PLAN NOTE
Will start meds and refer to behavioral health.  Orders:    citalopram (CeleXA) 20 mg tablet; Take 1 tablet (20 mg) by mouth once daily.

## 2024-09-24 NOTE — PROGRESS NOTES
Subjective   Patient ID: Mele Mayes is a 43 y.o. female who presents for Anxiety. She has been having panic attacks and anxiety. She tried medication in the past and she didn't like how it made her feel.  She is continue to have periodic issues with her employer.  She tends to have a quick temper.    Anxiety  Presents for initial visit. Onset was at an unknown time. Patient reports no chest pain, depressed mood, feeling of choking, nausea, palpitations or shortness of breath. Symptoms occur most days. The severity of symptoms is severe. The symptoms are aggravated by work stress. The quality of sleep is poor.     Compliance with prior treatments has been variable.        Review of Systems   Constitutional:  Negative for activity change and chills.   HENT:  Negative for congestion.    Respiratory:  Negative for chest tightness and shortness of breath.    Cardiovascular:  Negative for chest pain and palpitations.   Gastrointestinal:  Negative for diarrhea and nausea.   Musculoskeletal:  Negative for myalgias.   Neurological:  Negative for weakness.   Psychiatric/Behavioral:  Positive for agitation and behavioral problems. Negative for self-injury.        Objective   LMP 06/15/2023     Physical Exam  Constitutional:       Appearance: Normal appearance.   HENT:      Head: Normocephalic and atraumatic.   Eyes:      Extraocular Movements: Extraocular movements intact.   Cardiovascular:      Rate and Rhythm: Normal rate and regular rhythm.   Psychiatric:         Mood and Affect: Mood is anxious.       Assessment/Plan   Assessment & Plan  Anxiety  Will start meds and refer to behavioral health.  Orders:    citalopram (CeleXA) 20 mg tablet; Take 1 tablet (20 mg) by mouth once daily.    Panic attacks  See above.   Orders:    citalopram (CeleXA) 20 mg tablet; Take 1 tablet (20 mg) by mouth once daily.    Primary hypertension  Restart BP meds

## 2024-09-25 ASSESSMENT — ENCOUNTER SYMPTOMS
DEPRESSED MOOD: 0
FEELING OF CHOKING: 0

## 2024-09-28 ENCOUNTER — APPOINTMENT (OUTPATIENT)
Dept: CARDIOLOGY | Facility: HOSPITAL | Age: 44
End: 2024-09-28
Payer: COMMERCIAL

## 2024-09-28 ENCOUNTER — HOSPITAL ENCOUNTER (EMERGENCY)
Facility: HOSPITAL | Age: 44
Discharge: HOME | End: 2024-09-28
Attending: EMERGENCY MEDICINE
Payer: COMMERCIAL

## 2024-09-28 VITALS
DIASTOLIC BLOOD PRESSURE: 87 MMHG | OXYGEN SATURATION: 96 % | SYSTOLIC BLOOD PRESSURE: 131 MMHG | WEIGHT: 134 LBS | HEIGHT: 61 IN | TEMPERATURE: 98 F | RESPIRATION RATE: 17 BRPM | HEART RATE: 68 BPM | BODY MASS INDEX: 25.3 KG/M2

## 2024-09-28 DIAGNOSIS — R00.2 PALPITATIONS: Primary | ICD-10-CM

## 2024-09-28 LAB
ALBUMIN SERPL BCP-MCNC: 4.1 G/DL (ref 3.4–5)
ALP SERPL-CCNC: 146 U/L (ref 33–110)
ALT SERPL W P-5'-P-CCNC: 31 U/L (ref 7–45)
ANION GAP SERPL CALCULATED.3IONS-SCNC: 13 MMOL/L (ref 10–20)
AST SERPL W P-5'-P-CCNC: 29 U/L (ref 9–39)
BASOPHILS # BLD AUTO: 0.03 X10*3/UL (ref 0–0.1)
BASOPHILS NFR BLD AUTO: 0.5 %
BILIRUB SERPL-MCNC: 0.3 MG/DL (ref 0–1.2)
BUN SERPL-MCNC: 14 MG/DL (ref 6–23)
CALCIUM SERPL-MCNC: 9.6 MG/DL (ref 8.6–10.3)
CARDIAC TROPONIN I PNL SERPL HS: <3 NG/L (ref 0–13)
CARDIAC TROPONIN I PNL SERPL HS: <3 NG/L (ref 0–13)
CHLORIDE SERPL-SCNC: 102 MMOL/L (ref 98–107)
CO2 SERPL-SCNC: 25 MMOL/L (ref 21–32)
CREAT SERPL-MCNC: 0.74 MG/DL (ref 0.5–1.05)
EGFRCR SERPLBLD CKD-EPI 2021: >90 ML/MIN/1.73M*2
EOSINOPHIL # BLD AUTO: 0.1 X10*3/UL (ref 0–0.7)
EOSINOPHIL NFR BLD AUTO: 1.6 %
ERYTHROCYTE [DISTWIDTH] IN BLOOD BY AUTOMATED COUNT: 12.7 % (ref 11.5–14.5)
GLUCOSE SERPL-MCNC: 96 MG/DL (ref 74–99)
HCT VFR BLD AUTO: 39.4 % (ref 36–46)
HGB BLD-MCNC: 13.7 G/DL (ref 12–16)
IMM GRANULOCYTES # BLD AUTO: 0.01 X10*3/UL (ref 0–0.7)
IMM GRANULOCYTES NFR BLD AUTO: 0.2 % (ref 0–0.9)
LYMPHOCYTES # BLD AUTO: 2.19 X10*3/UL (ref 1.2–4.8)
LYMPHOCYTES NFR BLD AUTO: 35.7 %
MCH RBC QN AUTO: 30.7 PG (ref 26–34)
MCHC RBC AUTO-ENTMCNC: 34.8 G/DL (ref 32–36)
MCV RBC AUTO: 88 FL (ref 80–100)
MONOCYTES # BLD AUTO: 0.61 X10*3/UL (ref 0.1–1)
MONOCYTES NFR BLD AUTO: 10 %
NEUTROPHILS # BLD AUTO: 3.19 X10*3/UL (ref 1.2–7.7)
NEUTROPHILS NFR BLD AUTO: 52 %
NRBC BLD-RTO: 0 /100 WBCS (ref 0–0)
PLATELET # BLD AUTO: 255 X10*3/UL (ref 150–450)
POTASSIUM SERPL-SCNC: 3.7 MMOL/L (ref 3.5–5.3)
PROT SERPL-MCNC: 7.7 G/DL (ref 6.4–8.2)
RBC # BLD AUTO: 4.46 X10*6/UL (ref 4–5.2)
SODIUM SERPL-SCNC: 136 MMOL/L (ref 136–145)
T4 FREE SERPL-MCNC: 0.91 NG/DL (ref 0.61–1.12)
TSH SERPL-ACNC: 4.17 MIU/L (ref 0.44–3.98)
WBC # BLD AUTO: 6.1 X10*3/UL (ref 4.4–11.3)

## 2024-09-28 PROCEDURE — 2500000004 HC RX 250 GENERAL PHARMACY W/ HCPCS (ALT 636 FOR OP/ED): Performed by: EMERGENCY MEDICINE

## 2024-09-28 PROCEDURE — 84484 ASSAY OF TROPONIN QUANT: CPT | Performed by: EMERGENCY MEDICINE

## 2024-09-28 PROCEDURE — 84443 ASSAY THYROID STIM HORMONE: CPT | Performed by: EMERGENCY MEDICINE

## 2024-09-28 PROCEDURE — 93005 ELECTROCARDIOGRAM TRACING: CPT

## 2024-09-28 PROCEDURE — 36415 COLL VENOUS BLD VENIPUNCTURE: CPT | Performed by: EMERGENCY MEDICINE

## 2024-09-28 PROCEDURE — 84439 ASSAY OF FREE THYROXINE: CPT | Performed by: EMERGENCY MEDICINE

## 2024-09-28 PROCEDURE — 99283 EMERGENCY DEPT VISIT LOW MDM: CPT | Mod: 25

## 2024-09-28 PROCEDURE — 80053 COMPREHEN METABOLIC PANEL: CPT | Performed by: EMERGENCY MEDICINE

## 2024-09-28 PROCEDURE — 85025 COMPLETE CBC W/AUTO DIFF WBC: CPT | Performed by: EMERGENCY MEDICINE

## 2024-09-28 PROCEDURE — 96360 HYDRATION IV INFUSION INIT: CPT

## 2024-09-28 RX ADMIN — SODIUM CHLORIDE 500 ML: 900 INJECTION, SOLUTION INTRAVENOUS at 03:50

## 2024-09-28 ASSESSMENT — PAIN SCALES - GENERAL: PAINLEVEL_OUTOF10: 0 - NO PAIN

## 2024-09-28 ASSESSMENT — PAIN - FUNCTIONAL ASSESSMENT: PAIN_FUNCTIONAL_ASSESSMENT: 0-10

## 2024-09-28 ASSESSMENT — LIFESTYLE VARIABLES
HAVE YOU EVER FELT YOU SHOULD CUT DOWN ON YOUR DRINKING: NO
EVER HAD A DRINK FIRST THING IN THE MORNING TO STEADY YOUR NERVES TO GET RID OF A HANGOVER: NO
HAVE PEOPLE ANNOYED YOU BY CRITICIZING YOUR DRINKING: NO
EVER FELT BAD OR GUILTY ABOUT YOUR DRINKING: NO
TOTAL SCORE: 0

## 2024-09-28 ASSESSMENT — COLUMBIA-SUICIDE SEVERITY RATING SCALE - C-SSRS
1. IN THE PAST MONTH, HAVE YOU WISHED YOU WERE DEAD OR WISHED YOU COULD GO TO SLEEP AND NOT WAKE UP?: NO
6. HAVE YOU EVER DONE ANYTHING, STARTED TO DO ANYTHING, OR PREPARED TO DO ANYTHING TO END YOUR LIFE?: NO
2. HAVE YOU ACTUALLY HAD ANY THOUGHTS OF KILLING YOURSELF?: NO

## 2024-09-28 NOTE — ED TRIAGE NOTES
Patient arrives via medic from home with c/o fast heart rate. Pt states she was home, relaxing when it happened. Denies CP, SOB.  PMHx anxiety, HTN

## 2024-09-28 NOTE — Clinical Note
Mele Mayes was seen and treated in our emergency department on 9/28/2024.  She may return to work on 10/01/2024.       If you have any questions or concerns, please don't hesitate to call.      Morena Moncada MD

## 2024-09-28 NOTE — ED PROVIDER NOTES
HPI   Chief Complaint   Patient presents with    Rapid Heart Rate       43-year-old female with a history of anxiety, diabetes mellitus and hypertension comes to the emergency department with a chief complaint of palpitations.  Patient arrives via medic from home with c/o fast heart rate. Pt states she was home, relaxing when it happened. Denies CP, SOB.  She states that it is not associated with cough, abdominal pain,          History provided by:  Patient   used: No            Patient History   Past Medical History:   Diagnosis Date    Anxiety     Other specified diabetes mellitus without complications (Multi)     Diabetes mellitus of other type without complication    Personal history of other diseases of the circulatory system     History of hypertension    Personal history of other medical treatment 05/25/2016    History of screening mammography     Past Surgical History:   Procedure Laterality Date    HYSTERECTOMY      NASAL SEPTUM SURGERY      TONSILLECTOMY       Family History   Problem Relation Name Age of Onset    Breast cancer Mother  40    Hypertension Father      Diabetes Father      Breast cancer Maternal Grandmother  30     Social History     Tobacco Use    Smoking status: Never     Passive exposure: Never    Smokeless tobacco: Never   Vaping Use    Vaping status: Never Used   Substance Use Topics    Alcohol use: Yes    Drug use: Not Currently       Physical Exam   ED Triage Vitals [09/28/24 0249]   Temperature Heart Rate Respirations BP   36.7 °C (98 °F) 74 18 (!) 148/93      Pulse Ox Temp Source Heart Rate Source Patient Position   100 % Oral -- Sitting      BP Location FiO2 (%)     Left arm --       Physical Exam  Vitals and nursing note reviewed.   Constitutional:       General: She is not in acute distress.     Appearance: She is well-developed.   HENT:      Head: Normocephalic and atraumatic.   Eyes:      Conjunctiva/sclera: Conjunctivae normal.   Cardiovascular:      Rate  and Rhythm: Normal rate and regular rhythm.      Heart sounds: No murmur heard.  Pulmonary:      Effort: Pulmonary effort is normal. No respiratory distress.      Breath sounds: Normal breath sounds.   Abdominal:      Palpations: Abdomen is soft.      Tenderness: There is no abdominal tenderness.   Musculoskeletal:         General: No swelling.      Cervical back: Neck supple.   Skin:     General: Skin is warm and dry.      Capillary Refill: Capillary refill takes less than 2 seconds.   Neurological:      Mental Status: She is alert.   Psychiatric:         Mood and Affect: Mood normal.           ED Course & MDM   ED Course as of 10/01/24 0730   Sat Sep 28, 2024   0453 ECG 12 lead  ECG performed on September 28, 2024 at 2:54 AM and interpreted by me at 2:58 AM showing a normal sinus rhythm with sinus arrhythmia, no axis deviation, intervals within normal limits, no STEMI.  No delta wave or other dangerous arrhythmia.  No previous for comparison. [EG]   0637 TSH with reflex to Free T4 if abnormal(!)  Elevated TSH, but free T4 [EG]   0637 Troponin Series, (0, 1 HR)  Negative troponins x 2 [EG]   0637 CBC and Auto Differential  Within normal limits [EG]   0637 Comprehensive metabolic panel(!)  Noncontributory [EG]      ED Course User Index  [EG] Morena Moncada MD         Diagnoses as of 10/01/24 0730   Palpitations                 No data recorded                                 Medical Decision Making    HPI:  As Above  PMHx/PSHx/Meds/Allergies/SH/FH as per nursing documentation and reviewed.  Review of systems: Total of 10 systems reviewed and otherwise negative except as noted elsewhere    DDX: As described in MDM    If performed, radiology listed above interpreted by me and confirmed by the Radiologist.  Medications administered during this visit (name and route): see MAR  Social determinants of health considered for this visit: lives at home  If performed, EKG interpreted by me and detailed above    [facility]  Summary/considerations:  See ED course for further details    43-year-old female presenting with palpitations.  Cardiac workup is not consistent with prolonged QT, delta waves or significant arrhythmias.  Troponins are negative x 2 and ACS is also not likely to occur.  Her history is negative for chest pain and she does not have any murmurs, irregular heartbeat, rubs, gallops.  She is clear to auscultation bilaterally.  The patient's TSH was elevated, but free T4 is within normal limits and thyroiditis is unlikely.  Patient will be ordered a Holter monitor and given a referral to cardiology.  She will be discharged home and also instructed to follow-up with her primary care provider in the next 2 to 3 days.    Prescriptions provided include: None    The patient was seen and triaged by our nursing/medic staff, their vitals were taken and the staff notes were reviewed.  If the patient arrived by an EMS squad or an outside agency, we discussed the case with transporting EMS medic, police, or other historians. My initial assessment was attention to their airway, breathing, and circulatory status.  We addressed any immediate or life threatening findings and completed a medical history and a physical exam if the patient or those legally responsible were in agreement with this.   Prior to the patient being discharged, I or my PA/NP or the nursing staff discussed the differential, results and discharge plan with the patient and/or family/friend/caregiver if present.  I emphasized the importance of follow-up in 2-3 days unless otherwise specified.  I explained reasons for the patient to return to the Emergency Department. Additional verbal discharge instructions were also given and discussed with the patient to supplement those generated by the EMR. We also discussed medications that were prescribed (if any) including common side effects and interactions. The patient was advised to abstain from driving, operating heavy  machinery or making significant decisions while taking medications such as antihistamines, benzodiazepines, opiates and muscle relaxers. All questions were addressed.  They understand return precautions and discharge instructions. The patient and/or family/friend/caregiver expressed understanding.  **Disclaimer:  This note was dictated by speech recognition technology.  Minor errors in transcription may be present.  Please contact for clarification or corrections.      Amount and/or Complexity of Data Reviewed  Labs: ordered. Decision-making details documented in ED Course.  ECG/medicine tests: ordered and independent interpretation performed. Decision-making details documented in ED Course.        Procedure  Procedures     Morena Moncada MD  10/01/24 7199

## 2024-09-30 ENCOUNTER — TELEPHONE (OUTPATIENT)
Dept: PRIMARY CARE | Facility: HOSPITAL | Age: 44
End: 2024-09-30

## 2024-10-02 LAB
ATRIAL RATE: 70 BPM
P AXIS: 71 DEGREES
P OFFSET: 201 MS
P ONSET: 145 MS
PR INTERVAL: 154 MS
Q ONSET: 222 MS
QRS COUNT: 11 BEATS
QRS DURATION: 80 MS
QT INTERVAL: 416 MS
QTC CALCULATION(BAZETT): 449 MS
QTC FREDERICIA: 438 MS
R AXIS: 52 DEGREES
T AXIS: 57 DEGREES
T OFFSET: 430 MS
VENTRICULAR RATE: 70 BPM

## 2024-10-03 ENCOUNTER — OFFICE VISIT (OUTPATIENT)
Dept: PRIMARY CARE | Facility: CLINIC | Age: 44
End: 2024-10-03
Payer: COMMERCIAL

## 2024-10-03 ENCOUNTER — APPOINTMENT (OUTPATIENT)
Dept: PRIMARY CARE | Facility: CLINIC | Age: 44
End: 2024-10-03
Payer: COMMERCIAL

## 2024-10-03 VITALS
DIASTOLIC BLOOD PRESSURE: 70 MMHG | WEIGHT: 133 LBS | SYSTOLIC BLOOD PRESSURE: 102 MMHG | HEIGHT: 61 IN | HEART RATE: 84 BPM | TEMPERATURE: 98 F | RESPIRATION RATE: 18 BRPM | BODY MASS INDEX: 25.11 KG/M2

## 2024-10-03 DIAGNOSIS — I10 PRIMARY HYPERTENSION: ICD-10-CM

## 2024-10-03 DIAGNOSIS — F41.9 ANXIETY: Primary | ICD-10-CM

## 2024-10-03 PROCEDURE — 3050F LDL-C >= 130 MG/DL: CPT | Performed by: INTERNAL MEDICINE

## 2024-10-03 PROCEDURE — 3062F POS MACROALBUMINURIA REV: CPT | Performed by: INTERNAL MEDICINE

## 2024-10-03 PROCEDURE — 3044F HG A1C LEVEL LT 7.0%: CPT | Performed by: INTERNAL MEDICINE

## 2024-10-03 PROCEDURE — 99214 OFFICE O/P EST MOD 30 MIN: CPT | Performed by: INTERNAL MEDICINE

## 2024-10-03 PROCEDURE — 3008F BODY MASS INDEX DOCD: CPT | Performed by: INTERNAL MEDICINE

## 2024-10-03 PROCEDURE — 3078F DIAST BP <80 MM HG: CPT | Performed by: INTERNAL MEDICINE

## 2024-10-03 PROCEDURE — 3074F SYST BP LT 130 MM HG: CPT | Performed by: INTERNAL MEDICINE

## 2024-10-03 PROCEDURE — 1036F TOBACCO NON-USER: CPT | Performed by: INTERNAL MEDICINE

## 2024-10-03 RX ORDER — AMLODIPINE BESYLATE 5 MG/1
5 TABLET ORAL DAILY
Qty: 90 TABLET | Refills: 3 | Status: SHIPPED | OUTPATIENT
Start: 2024-10-03 | End: 2025-10-03

## 2024-10-03 ASSESSMENT — ENCOUNTER SYMPTOMS
SHORTNESS OF BREATH: 1
AGITATION: 1
ORTHOPNEA: 1
NECK PAIN: 1
FEVER: 1
SPUTUM PRODUCTION: 1
SORE THROAT: 1
ABDOMINAL PAIN: 1
PND: 1
SWOLLEN GLANDS: 1

## 2024-10-03 NOTE — PROGRESS NOTES
"Subjective   Patient ID: Mele Mayes is a 43 y.o. female who presents for No chief complaint on file..  Here to discuss her anxiety.  \"The medicine didn't work.\"  Poor sleep.  Stomach upset. \"In the meantime, I am suffering.\"  She has been overly anxious for a very quick fix for her anxiety and depression.  She has very little estrella in medications and then waiting for them to take effect.  She has not seen any improvement with the medication I prescribed last week and her sleep remains poor.    Shortness of Breath  This is a new problem. The current episode started in the past 7 days. The problem occurs daily. The problem has been rapidly worsening. The average episode lasts 10 minutes. Associated symptoms include abdominal pain, chest pain, a fever, neck pain, orthopnea, PND, a rash, a sore throat, sputum production and swollen glands. The symptoms are aggravated by emotional upset, URIs, pollens and lying flat.        Review of Systems   Constitutional:  Positive for fever.   HENT:  Positive for sore throat.    Respiratory:  Positive for sputum production and shortness of breath.    Cardiovascular:  Positive for chest pain, orthopnea and PND.   Gastrointestinal:  Positive for abdominal pain.   Musculoskeletal:  Positive for neck pain.   Skin:  Positive for rash.   Psychiatric/Behavioral:  Positive for agitation.        Objective   /70 (BP Location: Left arm)   Pulse 84   Temp 36.7 °C (98 °F)   Resp 18   Ht 1.549 m (5' 1\")   Wt 60.3 kg (133 lb)   LMP 06/15/2023   BMI 25.13 kg/m²     Physical Exam  Constitutional:       Appearance: Normal appearance.   HENT:      Head: Normocephalic and atraumatic.      Nose: Nose normal.      Mouth/Throat:      Mouth: Mucous membranes are moist.   Eyes:      Extraocular Movements: Extraocular movements intact.   Cardiovascular:      Rate and Rhythm: Normal rate and regular rhythm.   Pulmonary:      Effort: No respiratory distress.      Breath sounds: No wheezing. "   Neurological:      General: No focal deficit present.   Psychiatric:         Mood and Affect: Mood is anxious.         Behavior: Behavior normal.       Assessment/Plan   Problem List Items Addressed This Visit             ICD-10-CM    Hypertension I10    Anxiety - Primary F41.9   Patient reassured.  Anxious about passing her class. Decrease amlodipine to 5 mg daily.  We talked about her perspective on things and how they were somewhat real unrealistic given the timeframe she expects everything to work.  We asked her to begin begin the amlodipine and citalopram and take the medications that we at least 6 weeks before she determines that they have not worked.

## 2024-10-15 ENCOUNTER — APPOINTMENT (OUTPATIENT)
Dept: PRIMARY CARE | Facility: CLINIC | Age: 44
End: 2024-10-15
Payer: COMMERCIAL

## 2024-10-15 VITALS
WEIGHT: 130.2 LBS | TEMPERATURE: 98 F | BODY MASS INDEX: 24.58 KG/M2 | HEIGHT: 61 IN | SYSTOLIC BLOOD PRESSURE: 123 MMHG | DIASTOLIC BLOOD PRESSURE: 87 MMHG | RESPIRATION RATE: 18 BRPM | HEART RATE: 74 BPM

## 2024-10-15 DIAGNOSIS — F41.9 ANXIETY: ICD-10-CM

## 2024-10-15 DIAGNOSIS — J32.8 OTHER CHRONIC SINUSITIS: ICD-10-CM

## 2024-10-15 DIAGNOSIS — Z00.00 HEALTHCARE MAINTENANCE: Primary | ICD-10-CM

## 2024-10-15 PROCEDURE — 3074F SYST BP LT 130 MM HG: CPT | Performed by: INTERNAL MEDICINE

## 2024-10-15 PROCEDURE — 3062F POS MACROALBUMINURIA REV: CPT | Performed by: INTERNAL MEDICINE

## 2024-10-15 PROCEDURE — 99396 PREV VISIT EST AGE 40-64: CPT | Performed by: INTERNAL MEDICINE

## 2024-10-15 PROCEDURE — 3079F DIAST BP 80-89 MM HG: CPT | Performed by: INTERNAL MEDICINE

## 2024-10-15 PROCEDURE — 3008F BODY MASS INDEX DOCD: CPT | Performed by: INTERNAL MEDICINE

## 2024-10-15 PROCEDURE — 99213 OFFICE O/P EST LOW 20 MIN: CPT | Performed by: INTERNAL MEDICINE

## 2024-10-15 PROCEDURE — 3050F LDL-C >= 130 MG/DL: CPT | Performed by: INTERNAL MEDICINE

## 2024-10-15 PROCEDURE — 3044F HG A1C LEVEL LT 7.0%: CPT | Performed by: INTERNAL MEDICINE

## 2024-10-15 RX ORDER — MELOXICAM 7.5 MG/1
7.5 TABLET ORAL DAILY PRN
COMMUNITY
Start: 2024-09-24

## 2024-10-15 ASSESSMENT — ENCOUNTER SYMPTOMS
SORE THROAT: 0
NAUSEA: 0
CHEST TIGHTNESS: 0
DIARRHEA: 0
WEAKNESS: 0
MYALGIAS: 0
PALPITATIONS: 0
SINUS PRESSURE: 0
CHILLS: 0
ACTIVITY CHANGE: 0
AGITATION: 0
SHORTNESS OF BREATH: 0

## 2024-10-15 ASSESSMENT — PAIN SCALES - GENERAL: PAINLEVEL: 0-NO PAIN

## 2024-10-15 NOTE — PROGRESS NOTES
"Subjective   Patient ID: Mele Mayes is a 43 y.o. female who presents for Annual Exam. Feeling better overall.  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  We talked about her perspective and strategies for improving it.  She has been taking her medications as prescribed and she was most recently given meloxicam and wanted to make sure it was okay to take with the other medicines.    HPI     Review of Systems   Constitutional:  Negative for activity change and chills.   HENT:  Positive for congestion. Negative for sinus pressure and sore throat.    Respiratory:  Negative for chest tightness and shortness of breath.    Cardiovascular:  Negative for chest pain and palpitations.   Gastrointestinal:  Negative for diarrhea and nausea.   Musculoskeletal:  Negative for myalgias.   Neurological:  Negative for weakness.   Psychiatric/Behavioral:  Negative for agitation and behavioral problems.        Objective   /87   Pulse 74   Temp 36.7 °C (98 °F)   Resp 18   Ht 1.549 m (5' 1\")   Wt 59.1 kg (130 lb 3.2 oz)   LMP 06/15/2023   BMI 24.60 kg/m²     Physical Exam  Constitutional:       Appearance: Normal appearance.   HENT:      Head: Normocephalic and atraumatic.      Nose: Nose normal.      Mouth/Throat:      Mouth: Mucous membranes are moist.   Eyes:      Extraocular Movements: Extraocular movements intact.      Pupils: Pupils are equal, round, and reactive to light.   Cardiovascular:      Rate and Rhythm: Normal rate and regular rhythm.   Pulmonary:      Effort: No respiratory distress.      Breath sounds: No wheezing.   Abdominal:      General: Bowel sounds are normal.      Palpations: Abdomen is soft.   Neurological:      General: No focal deficit present.       Assessment/Plan   Problem List Items Addressed This Visit             ICD-10-CM    Anxiety F41.9     Other Visit Diagnoses         Codes    Healthcare maintenance    -  Primary Z00.00    Other chronic sinusitis  "    J32.8        She has had a number of labs recently so there is nothing ordered from that standpoint.  Her anxiety has improved.  She should continue on medication and check back with me in the new year.

## 2024-10-16 PROBLEM — J32.8 OTHER CHRONIC SINUSITIS: Status: ACTIVE | Noted: 2024-10-16

## 2024-10-21 ENCOUNTER — TELEPHONE (OUTPATIENT)
Dept: PRIMARY CARE | Facility: HOSPITAL | Age: 44
End: 2024-10-21

## 2024-11-15 DIAGNOSIS — F41.0 PANIC ATTACKS: ICD-10-CM

## 2024-11-15 DIAGNOSIS — F41.9 ANXIETY: Primary | ICD-10-CM

## 2024-11-15 NOTE — PROGRESS NOTES
The patient questing referral to behavioral health to discuss anxiety, depression, and anxiety attacks.

## 2024-11-25 ENCOUNTER — APPOINTMENT (OUTPATIENT)
Dept: ALLERGY | Facility: CLINIC | Age: 44
End: 2024-11-25
Payer: COMMERCIAL

## 2024-11-25 VITALS — HEART RATE: 88 BPM | SYSTOLIC BLOOD PRESSURE: 133 MMHG | DIASTOLIC BLOOD PRESSURE: 82 MMHG

## 2024-11-25 DIAGNOSIS — J30.1 ALLERGIC RHINITIS DUE TO POLLEN, UNSPECIFIED SEASONALITY: ICD-10-CM

## 2024-11-25 DIAGNOSIS — J32.8 OTHER CHRONIC SINUSITIS: ICD-10-CM

## 2024-11-25 DIAGNOSIS — H10.45 CHRONIC ALLERGIC CONJUNCTIVITIS: ICD-10-CM

## 2024-11-25 DIAGNOSIS — J30.89 ALLERGIC RHINITIS DUE TO OTHER ALLERGIC TRIGGER, UNSPECIFIED SEASONALITY: Primary | ICD-10-CM

## 2024-11-25 PROCEDURE — 95024 IQ TESTS W/ALLERGENIC XTRCS: CPT | Performed by: ALLERGY & IMMUNOLOGY

## 2024-11-25 PROCEDURE — 3050F LDL-C >= 130 MG/DL: CPT | Performed by: ALLERGY & IMMUNOLOGY

## 2024-11-25 PROCEDURE — 3075F SYST BP GE 130 - 139MM HG: CPT | Performed by: ALLERGY & IMMUNOLOGY

## 2024-11-25 PROCEDURE — 3079F DIAST BP 80-89 MM HG: CPT | Performed by: ALLERGY & IMMUNOLOGY

## 2024-11-25 PROCEDURE — 3044F HG A1C LEVEL LT 7.0%: CPT | Performed by: ALLERGY & IMMUNOLOGY

## 2024-11-25 PROCEDURE — 99214 OFFICE O/P EST MOD 30 MIN: CPT | Performed by: ALLERGY & IMMUNOLOGY

## 2024-11-25 PROCEDURE — 3062F POS MACROALBUMINURIA REV: CPT | Performed by: ALLERGY & IMMUNOLOGY

## 2024-11-25 NOTE — PROGRESS NOTES
"Subjective   Patient ID:   [id number]   Mele Mayes is a 44 y.o. female who presents for Allergic Rhinitis.    Chief Complaint   Patient presents with    Allergic Rhinitis          HPI  This patient is here to evaluate for:  Allergic rhinitis and conjunctivitis     Still having allergy symptoms despite the avoidance measures and xhance.     But will be difficult for her schedule to get SCIT (Subcutaneous Immunotherapy or \"Allergy Shots\").       Review of Systems   All other systems reviewed and are negative.        Objective     /82 (BP Location: Left arm, Patient Position: Sitting, BP Cuff Size: Adult)   Pulse 88   LMP 06/15/2023      Physical Exam  Constitutional:       Appearance: Normal appearance.   HENT:      Head: Normocephalic and atraumatic.   Eyes:      Extraocular Movements: Extraocular movements intact.      Conjunctiva/sclera: Conjunctivae normal.      Pupils: Pupils are equal, round, and reactive to light.   Pulmonary:      Effort: Pulmonary effort is normal.   Musculoskeletal:      Cervical back: Normal range of motion.   Neurological:      Mental Status: She is alert and oriented to person, place, and time. Mental status is at baseline.   Psychiatric:         Mood and Affect: Mood normal.         Behavior: Behavior normal.         Thought Content: Thought content normal.         Judgment: Judgment normal.            Current Outpatient Medications   Medication Sig Dispense Refill    amLODIPine (Norvasc) 5 mg tablet Take 1 tablet (5 mg) by mouth once daily. 90 tablet 3    citalopram (CeleXA) 20 mg tablet Take 1 tablet (20 mg) by mouth once daily. 30 tablet 11    fluticasone propionate (Xhance) 93 mcg/actuation aerosol breath activated Spray 1 spray in each nostril twice daily. 16 mL 11    meloxicam (Mobic) 7.5 mg tablet Take 1 tablet (7.5 mg) by mouth once daily as needed.       No current facility-administered medications for this visit.       Summary of the labs over the past 6 " months:    Admission on 09/28/2024, Discharged on 09/28/2024   Component Date Value Ref Range Status    Ventricular Rate 09/28/2024 70  BPM Final    Atrial Rate 09/28/2024 70  BPM Final    MS Interval 09/28/2024 154  ms Final    QRS Duration 09/28/2024 80  ms Final    QT Interval 09/28/2024 416  ms Final    QTC Calculation(Bazett) 09/28/2024 449  ms Final    P Axis 09/28/2024 71  degrees Final    R Axis 09/28/2024 52  degrees Final    T Axis 09/28/2024 57  degrees Final    QRS Count 09/28/2024 11  beats Final    Q Onset 09/28/2024 222  ms Final    P Onset 09/28/2024 145  ms Final    P Offset 09/28/2024 201  ms Final    T Offset 09/28/2024 430  ms Final    QTC Fredericia 09/28/2024 438  ms Final    WBC 09/28/2024 6.1  4.4 - 11.3 x10*3/uL Final    nRBC 09/28/2024 0.0  0.0 - 0.0 /100 WBCs Final    RBC 09/28/2024 4.46  4.00 - 5.20 x10*6/uL Final    Hemoglobin 09/28/2024 13.7  12.0 - 16.0 g/dL Final    Hematocrit 09/28/2024 39.4  36.0 - 46.0 % Final    MCV 09/28/2024 88  80 - 100 fL Final    MCH 09/28/2024 30.7  26.0 - 34.0 pg Final    MCHC 09/28/2024 34.8  32.0 - 36.0 g/dL Final    RDW 09/28/2024 12.7  11.5 - 14.5 % Final    Platelets 09/28/2024 255  150 - 450 x10*3/uL Final    Neutrophils % 09/28/2024 52.0  40.0 - 80.0 % Final    Immature Granulocytes %, Automated 09/28/2024 0.2  0.0 - 0.9 % Final    Lymphocytes % 09/28/2024 35.7  13.0 - 44.0 % Final    Monocytes % 09/28/2024 10.0  2.0 - 10.0 % Final    Eosinophils % 09/28/2024 1.6  0.0 - 6.0 % Final    Basophils % 09/28/2024 0.5  0.0 - 2.0 % Final    Neutrophils Absolute 09/28/2024 3.19  1.20 - 7.70 x10*3/uL Final    Immature Granulocytes Absolute, Au* 09/28/2024 0.01  0.00 - 0.70 x10*3/uL Final    Lymphocytes Absolute 09/28/2024 2.19  1.20 - 4.80 x10*3/uL Final    Monocytes Absolute 09/28/2024 0.61  0.10 - 1.00 x10*3/uL Final    Eosinophils Absolute 09/28/2024 0.10  0.00 - 0.70 x10*3/uL Final    Basophils Absolute 09/28/2024 0.03  0.00 - 0.10 x10*3/uL Final     Glucose 09/28/2024 96  74 - 99 mg/dL Final    Sodium 09/28/2024 136  136 - 145 mmol/L Final    Potassium 09/28/2024 3.7  3.5 - 5.3 mmol/L Final    Chloride 09/28/2024 102  98 - 107 mmol/L Final    Bicarbonate 09/28/2024 25  21 - 32 mmol/L Final    Anion Gap 09/28/2024 13  10 - 20 mmol/L Final    Urea Nitrogen 09/28/2024 14  6 - 23 mg/dL Final    Creatinine 09/28/2024 0.74  0.50 - 1.05 mg/dL Final    eGFR 09/28/2024 >90  >60 mL/min/1.73m*2 Final    Calcium 09/28/2024 9.6  8.6 - 10.3 mg/dL Final    Albumin 09/28/2024 4.1  3.4 - 5.0 g/dL Final    Alkaline Phosphatase 09/28/2024 146 (H)  33 - 110 U/L Final    Total Protein 09/28/2024 7.7  6.4 - 8.2 g/dL Final    AST 09/28/2024 29  9 - 39 U/L Final    Bilirubin, Total 09/28/2024 0.3  0.0 - 1.2 mg/dL Final    ALT 09/28/2024 31  7 - 45 U/L Final    Troponin I, High Sensitivity 09/28/2024 <3  0 - 13 ng/L Final    Troponin I, High Sensitivity 09/28/2024 <3  0 - 13 ng/L Final    Thyroid Stimulating Hormone 09/28/2024 4.17 (H)  0.44 - 3.98 mIU/L Final    Thyroxine, Free 09/28/2024 0.91  0.61 - 1.12 ng/dL Final   Lab on 07/09/2024   Component Date Value Ref Range Status    Follicle Stimulating Hormone 07/09/2024 4.0  IU/L Final    Thyroid Stimulating Hormone 07/09/2024 2.60  0.44 - 3.98 mIU/L Final    Glucose 07/09/2024 75  74 - 99 mg/dL Final    Sodium 07/09/2024 136  136 - 145 mmol/L Final    Potassium 07/09/2024 4.5  3.5 - 5.3 mmol/L Final    Chloride 07/09/2024 102  98 - 107 mmol/L Final    Bicarbonate 07/09/2024 27  21 - 32 mmol/L Final    Anion Gap 07/09/2024 12  10 - 20 mmol/L Final    Urea Nitrogen 07/09/2024 11  6 - 23 mg/dL Final    Creatinine 07/09/2024 0.84  0.50 - 1.05 mg/dL Final    eGFR 07/09/2024 89  >60 mL/min/1.73m*2 Final    Calcium 07/09/2024 9.9  8.6 - 10.6 mg/dL Final    Albumin 07/09/2024 4.3  3.4 - 5.0 g/dL Final    Alkaline Phosphatase 07/09/2024 147 (H)  33 - 110 U/L Final    Total Protein 07/09/2024 7.6  6.4 - 8.2 g/dL Final    AST 07/09/2024 21  9 -  39 U/L Final    Bilirubin, Total 07/09/2024 0.4  0.0 - 1.2 mg/dL Final    ALT 07/09/2024 27  7 - 45 U/L Final    Hemoglobin A1C 07/09/2024 5.8 (H)  see below % Final    Estimated Average Glucose 07/09/2024 120  Not Established mg/dL Final     Intradermal skin tests were positive to:  additional molds (mix #2), aspergillus and AP cat mix.  See previous testing.    Assessment/Plan   Diagnoses and all orders for this visit:  Allergic rhinitis due to other allergic trigger, unspecified seasonality  Allergic rhinitis due to pollen, unspecified seasonality  Other chronic sinusitis      I recommended allergy shots/immunotherapy. We discussed the benefits and risks of allergy immunotherapy including improving symptom control in 90% of individuals and the risk of an allergic reaction that would be treated in the office. I also recommend that this patient have an epinephrine auto-injector in case of the rare event of a serious allergic reaction. The patient was consented, questions were answered, and they demonstrate understanding. In order to do this in a safe manner, the patient will initially receive weekly doses in the office. We will then space them out over time to approximately monthly.     I understand you need to think about this and whether your schedule allows you to get allergy shots.     Return the consent if you would like to move forward.     In the meantime, continue the allergy avoidance measures and the medications.         Julio Jean MD

## 2025-02-18 ENCOUNTER — OFFICE VISIT (OUTPATIENT)
Dept: URGENT CARE | Age: 45
End: 2025-02-18
Payer: COMMERCIAL

## 2025-02-18 VITALS
SYSTOLIC BLOOD PRESSURE: 137 MMHG | WEIGHT: 134 LBS | TEMPERATURE: 96.8 F | DIASTOLIC BLOOD PRESSURE: 92 MMHG | OXYGEN SATURATION: 100 % | BODY MASS INDEX: 25.3 KG/M2 | HEART RATE: 71 BPM | HEIGHT: 61 IN

## 2025-02-18 DIAGNOSIS — K52.9 GASTROENTERITIS: Primary | ICD-10-CM

## 2025-02-18 ASSESSMENT — PAIN SCALES - GENERAL: PAINLEVEL_OUTOF10: 0-NO PAIN

## 2025-02-18 ASSESSMENT — ENCOUNTER SYMPTOMS: VOMITING: 1

## 2025-02-18 NOTE — PROGRESS NOTES
Mele Mayes female   1980 44 y.o.   [id number]      Chief Complaint    High Risk Breast Cancer         HPI  Mele Mayes is a 44 y.o.  AA female RTA  presenting for high risk breast surveillance care. She denies breast biopsy and surgery. In 2023, she had a negative 77 gene CancerNext panel. Family history of breast cancer in mother and maternal grandmother.     Declined endocrine therapy. She was claustrophobic for breast MRI. She does not wish to repeat it or do additional supplemental screenings. She has not scheduled her mammogram that is due in a few days.     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     BREAST IMAGIN2024 bilateral screening mammogram, BI-RADS Category 1.     REPRODUCTIVE HISTORY: menarche age 13, nulliparous, menopause age unknown, extremely dense breast tissue     FAMILY CANCER HISTORY:   Mother: Breast cancer age 40  Maternal grandmother: Breast cancer age 30    REVIEW OF SYSTEMS    Constitutional:  Negative for appetite change, fatigue, fever and unexpected weight change.   HENT:  Negative for ear pain, hearing loss, nosebleeds, sore throat and trouble swallowing.    Eyes:  Negative for discharge, itching and visual disturbance.   Respiratory:  Negative for cough, chest tightness and shortness of breath.    Cardiovascular:  Negative for chest pain, palpitations and leg swelling.   Breast: as indicated in HPI  Gastrointestinal:  Negative for abdominal pain, constipation, diarrhea and nausea.   Endocrine: Negative for cold intolerance and heat intolerance.   Genitourinary:  Negative for dysuria, frequency, hematuria, pelvic pain and vaginal bleeding.   Musculoskeletal:  Negative for arthralgias, back pain, gait problem, joint swelling and myalgias.   Skin:  Negative for color change and rash.   Allergic/Immunologic: Negative for environmental allergies and food allergies.   Neurological:  Negative for dizziness, tremors, speech difficulty, weakness, numbness and  headaches.   Hematological:  Does not bruise/bleed easily.   Psychiatric/Behavioral:  Negative for agitation, dysphoric mood and sleep disturbance. The patient is not nervous/anxious.         MEDICATIONS  Current Outpatient Medications   Medication Instructions    amLODIPine (NORVASC) 5 mg, oral, Daily    citalopram (CELEXA) 20 mg, oral, Daily    fluticasone propionate (Xhance) 93 mcg/actuation aerosol breath activated Spray 1 spray in each nostril twice daily.    meloxicam (MOBIC) 7.5 mg, Daily PRN        ALLERGIES  No Known Allergies     Past Medical History:   Diagnosis Date    Anxiety     Other specified diabetes mellitus without complications     Diabetes mellitus of other type without complication    Personal history of other diseases of the circulatory system     History of hypertension    Personal history of other medical treatment 05/25/2016    History of screening mammography      Past Surgical History:   Procedure Laterality Date    HYSTERECTOMY      NASAL SEPTUM SURGERY      TONSILLECTOMY        Family History   Problem Relation Name Age of Onset    Breast cancer Mother  40    Hypertension Father      Diabetes Father      Breast cancer Maternal Grandmother  30          SOCIAL HISTORY      Social History     Tobacco Use    Smoking status: Never     Passive exposure: Never    Smokeless tobacco: Never   Substance Use Topics    Alcohol use: Yes        VITALS  Vitals:    02/25/25 1400   BP: 134/83   Pulse: 86   Temp: 36.1 °C (97 °F)   SpO2: 99%            PHYSICAL EXAM  Patient is alert and oriented x3, with appropriate mood. The gait is steady and hand grasps are equal. Sclera clear. The breasts are nearly symmetrical. The tissue is dense in superolateral and soft below without palpable abnormalities, discrete nodules or masses. The skin and nipples appear normal. There is no cervical, supraclavicular, or axillary lymphadenopathy palpable. Heart rate and rhythm normal, S1 and S2 appreciated. The lungs are  clear bilaterally. Abdomen is soft & non-tender.    Physical Exam     IMAGING  BI mammo bilateral screening tomosynthesis 02/27/2024    Narrative  Interpreted By:  Carlos Mason,  STUDY:  BI MAMMO BILATERAL SCREENING TOMOSYNTHESIS;  2/27/2024 1:40 pm    INDICATION:  Screening. Patient has a family history of breast cancer in her  mother at age 50 and paternal grandmother at age 86.    Density:  The breast tissue is extremely dense, which may limit the  sensitivity of mammography.    No suspicious masses or calcifications are identified.    Impression  No mammographic evidence of malignancy.      BI-RADS Category:  1 Negative.  Recommendation:  Annual Screening.  Recommended Date:  1 Year.  Laterality:  Bilateral.        Time was spent viewing digital images of the radiology testing with the patient. I explained the results in depth, along with suggested explanation for follow up recommendations based on the testing results.          ORDERS  No orders of the defined types were placed in this encounter.         ASSESSMENT/PLAN  1. Screening mammogram for breast cancer  Clinic Appointment Request Follow Up      2. Breast cancer screening, high risk patient  Clinic Appointment Request Follow Up; SAN[facility] ([facility] screen mammogram)    Clinic Appointment Request    Clinic Appointment Request Follow Up             RISK PROFILE:        HIGH RISK PLAN  Yearly mammogram with digital breast tomosynthesis  Twice yearly clinical breast examinations  Breast MRI (to schedule call [phone]) - ordered due now   Monthly self breast examinations &/or regular self breast awareness  Vitamin D3 2000 IU/daily (over the counter) unless your PCP recommends you take a specific dose  Exercise 3-4 times per week for 45-60 minutes  Limit alcohol to 3-4 drinks per week if you are menopausal  Eat healthy low-fat diet with lots of vegetable & fruits    Recommended endocrine therapy, patient declined at this time.       You can see your  health information, review clinical summaries from office visits & test results online when you follow your health with MY  Chart, a personal health record. To sign up go to [email or web address]. If you need assistance with signing up or trouble getting into your account call [facility] Patient Line 24/7 at [phone].    My office phone number is [phone] if you need to get in touch with me or have additional questions or concerns. Thank you for choosing [facility] and trusting me as your healthcare provider. I look forward to seeing you again at your next office visit. I am honored to be a provider on your health care team and I remain dedicated to helping you achieve your health goals.      [facility]

## 2025-02-18 NOTE — PROGRESS NOTES
"Subjective   Patient ID: Mele Mayes is a 44 y.o. female. They present today with a chief complaint of Vomiting (Patient c/o, vomiting and diarrhea on Friday. Food poisoning, needs work note to return. ).    History of Present Illness  Patient is a pleasant 44-year-old -American female, past medical history of hypertension, hyperlipidemia, diabetes, presented to clinic for to complain of work note.  Patient is on Friday when she was at work she got food poisoning and began to vomit and left work sick.  She presented to clinic today completely symptom-free requesting a return to work note.  She denies any current abdominal pain, nausea, vomiting.  No further diarrhea.  No fever or chills.  No chest pain or shortness of breath.  No further complaints at this time.      Vomiting      Past Medical History  Allergies as of 02/18/2025    (No Known Allergies)       (Not in a hospital admission)         Past Medical History:   Diagnosis Date    Anxiety     Other specified diabetes mellitus without complications     Diabetes mellitus of other type without complication    Personal history of other diseases of the circulatory system     History of hypertension    Personal history of other medical treatment 05/25/2016    History of screening mammography       Past Surgical History:   Procedure Laterality Date    HYSTERECTOMY      NASAL SEPTUM SURGERY      TONSILLECTOMY          reports that she has never smoked. She has never been exposed to tobacco smoke. She has never used smokeless tobacco. She reports current alcohol use. She reports that she does not currently use drugs.    Review of Systems  Review of Systems   Gastrointestinal:  Positive for vomiting.                                  Objective    Vitals:    02/18/25 1340   BP: (!) 137/92   Pulse: 71   Temp: 36 °C (96.8 °F)   SpO2: 100%   Weight: 60.8 kg (134 lb)   Height: 1.549 m (5' 1\")     Patient's last menstrual period was 06/15/2023.    Physical " Exam  Constitutional: Alert, oriented, cooperative, in no acute distress. Appears well nourished and well hydrated.    Head: Normocephalic, atraumatic    Skin: Intact, dry skin. No lesions, rash, petechiae, or purpura.    Eyes: PERRL, EOMs intact, conjunctiva pink with no erythema or exudates. No scleral icterus. Eyelids without lesions.    ENT: Hearing grossly intact. No external deformities. Tympanic membranes intact with visible landmarks. Nares patent, mucus membranes moist. Dentition without lesions. Pharynx clear, uvula midline.    Neck: Trachea midline, no lymphadenopathy. No meningismus.     Pulmonary: Lungs clear to auscultation bilaterally with good chest wall excursion. No rales, rhonchi, or wheezing. No accessory muscle use or stridor.     Cardiac: Regular rate and rhythm. Normal S1 and S2 with no murmur, rub, or gallop. No JVD, carotids without bruits. 2+ DP and PT pulses.     Abdomen: Soft, nontender, normoactive bowel sounds. No palpable organomegaly or mass. No rebound or guarding. No CVA tenderness.     Genitourinary: Exam deferred.    Musculoskeletal: Full range of motion in extremities. No pain, edema, or deformities. Peripheral pulses full and equal. No cyanosis, or clubbing.     Neurological: Cranial nerves II through XII are grossly intact. Normal sensation, no weakness, no focal findings identified.     Psychiatric: Appropriate mood and affect. Calm.  Procedures    Point of Care Test & Imaging Results from this visit    No results found.    Diagnostic study results (if any) were reviewed by Parrish Matos PA-C.    Assessment/Plan   Allergies, medications, history, and pertinent labs/EKGs/Imaging reviewed by Parrish Matos PA-C.     Medical Decision Making  Patient was seen eval in the clinic for chief complaint of work note.  On exam patient is nontoxic well-appearing respite comfortably no acute distress.  Vital signs are stable, afebrile.  Chest is clear, regular, belly is  diffusely soft and nontender with no guarding rebound or rigidity.  Patient appears adequately hydrated with moist mucosal membranes and normal skin turgor.  I do feel patient can return to work at this time as she is asymptomatic and well-hydrated.  Work note was provided.  Vies follow-up with a primary care physician in the next week.  Discharged home at this time.  Reviewed my impression, plan, and return precautions with the patient.  She expresses understanding and agreement plan of care.    Orders and Diagnoses  Diagnoses and all orders for this visit:  Gastroenteritis        Medical Admin Record      Follow Up Instructions  No follow-ups on file.    Patient disposition: Home    Electronically signed by Parrish Matos PA-C  2:01 PM

## 2025-02-18 NOTE — LETTER
February 18, 2025     Patient: Mele Mayes   YOB: 1980   Date of Visit: 2/18/2025       To Whom It May Concern:    It is my medical opinion that Mele Mayes may return to full duty immediately with no restrictions.    If you have any questions or concerns, please don't hesitate to call.         Sincerely,        Parrish Matos PA-C    CC: No Recipients

## 2025-02-25 ENCOUNTER — OFFICE VISIT (OUTPATIENT)
Dept: SURGICAL ONCOLOGY | Facility: CLINIC | Age: 45
End: 2025-02-25
Payer: COMMERCIAL

## 2025-02-25 VITALS
HEART RATE: 86 BPM | BODY MASS INDEX: 26.26 KG/M2 | SYSTOLIC BLOOD PRESSURE: 134 MMHG | OXYGEN SATURATION: 99 % | WEIGHT: 139 LBS | DIASTOLIC BLOOD PRESSURE: 83 MMHG | TEMPERATURE: 97 F

## 2025-02-25 DIAGNOSIS — Z12.39 BREAST CANCER SCREENING, HIGH RISK PATIENT: ICD-10-CM

## 2025-02-25 DIAGNOSIS — Z12.31 SCREENING MAMMOGRAM FOR BREAST CANCER: Primary | ICD-10-CM

## 2025-02-25 PROCEDURE — 99214 OFFICE O/P EST MOD 30 MIN: CPT

## 2025-02-25 PROCEDURE — 3075F SYST BP GE 130 - 139MM HG: CPT

## 2025-02-25 PROCEDURE — 3079F DIAST BP 80-89 MM HG: CPT

## 2025-02-25 PROCEDURE — 1036F TOBACCO NON-USER: CPT

## 2025-02-25 ASSESSMENT — PATIENT HEALTH QUESTIONNAIRE - PHQ9
SUM OF ALL RESPONSES TO PHQ9 QUESTIONS 1 & 2: 0
2. FEELING DOWN, DEPRESSED OR HOPELESS: NOT AT ALL
1. LITTLE INTEREST OR PLEASURE IN DOING THINGS: NOT AT ALL

## 2025-02-25 ASSESSMENT — PAIN SCALES - GENERAL: PAINLEVEL_OUTOF10: 0-NO PAIN

## 2025-02-25 NOTE — PATIENT INSTRUCTIONS
Yearly mammogram with digital breast tomosynthesis  Twice yearly clinical breast examinations  Breast MRI (to schedule call [phone]) - ordered due now   Monthly self breast examinations &/or regular self breast awareness  Vitamin D3 2000 IU/daily (over the counter) unless your PCP recommends you take a specific dose  Exercise 3-4 times per week for 45-60 minutes  Limit alcohol to 3-4 drinks per week if you are menopausal  Eat healthy low-fat diet with lots of vegetable & fruits    Recommended endocrine therapy, patient declined at this time.       You can see your health information, review clinical summaries from office visits & test results online when you follow your health with MY  Chart, a personal health record. To sign up go to [email or web address]. If you need assistance with signing up or trouble getting into your account call [facility] Patient Line 24/7 at [phone].    My office phone number is [phone] if you need to get in touch with me or have additional questions or concerns. Thank you for choosing [facility] and trusting me as your healthcare provider. I look forward to seeing you again at your next office visit. I am honored to be a provider on your health care team and I remain dedicated to helping you achieve your health goals.

## 2025-02-26 ENCOUNTER — HOSPITAL ENCOUNTER (OUTPATIENT)
Dept: RADIOLOGY | Facility: CLINIC | Age: 45
Discharge: HOME | End: 2025-02-26
Payer: COMMERCIAL

## 2025-02-26 DIAGNOSIS — R92.8 OTHER ABNORMAL AND INCONCLUSIVE FINDINGS ON DIAGNOSTIC IMAGING OF BREAST: ICD-10-CM

## 2025-02-26 PROCEDURE — 77067 SCR MAMMO BI INCL CAD: CPT

## 2025-02-27 ENCOUNTER — APPOINTMENT (OUTPATIENT)
Dept: SURGICAL ONCOLOGY | Facility: CLINIC | Age: 45
End: 2025-02-27
Payer: COMMERCIAL

## 2025-04-15 ENCOUNTER — APPOINTMENT (OUTPATIENT)
Dept: PRIMARY CARE | Facility: CLINIC | Age: 45
End: 2025-04-15
Payer: COMMERCIAL

## 2025-04-15 VITALS
DIASTOLIC BLOOD PRESSURE: 89 MMHG | TEMPERATURE: 98 F | BODY MASS INDEX: 25.11 KG/M2 | HEIGHT: 61 IN | WEIGHT: 133 LBS | OXYGEN SATURATION: 98 % | SYSTOLIC BLOOD PRESSURE: 130 MMHG | RESPIRATION RATE: 18 BRPM | HEART RATE: 73 BPM

## 2025-04-15 DIAGNOSIS — F41.9 ANXIETY: ICD-10-CM

## 2025-04-15 DIAGNOSIS — E55.9 HYPOVITAMINOSIS D: ICD-10-CM

## 2025-04-15 DIAGNOSIS — Z00.00 HEALTHCARE MAINTENANCE: ICD-10-CM

## 2025-04-15 DIAGNOSIS — I10 PRIMARY HYPERTENSION: Primary | ICD-10-CM

## 2025-04-15 PROBLEM — E11.9 CONTROLLED TYPE 2 DIABETES MELLITUS WITHOUT COMPLICATION, WITHOUT LONG-TERM CURRENT USE OF INSULIN: Status: RESOLVED | Noted: 2024-04-01 | Resolved: 2025-04-15

## 2025-04-15 PROCEDURE — 1036F TOBACCO NON-USER: CPT | Performed by: INTERNAL MEDICINE

## 2025-04-15 PROCEDURE — 3079F DIAST BP 80-89 MM HG: CPT | Performed by: INTERNAL MEDICINE

## 2025-04-15 PROCEDURE — 99214 OFFICE O/P EST MOD 30 MIN: CPT | Performed by: INTERNAL MEDICINE

## 2025-04-15 PROCEDURE — 3075F SYST BP GE 130 - 139MM HG: CPT | Performed by: INTERNAL MEDICINE

## 2025-04-15 PROCEDURE — 3008F BODY MASS INDEX DOCD: CPT | Performed by: INTERNAL MEDICINE

## 2025-04-15 ASSESSMENT — ENCOUNTER SYMPTOMS
ALLERGIC REACTION: 1
DECREASED CONCENTRATION: 0
CHILLS: 0
CHEST TIGHTNESS: 0
PALPITATIONS: 0
AGITATION: 0
SORE THROAT: 1
DIARRHEA: 0
WEAKNESS: 0
ACTIVITY CHANGE: 0
SHORTNESS OF BREATH: 0
MYALGIAS: 0
NAUSEA: 0

## 2025-04-15 NOTE — PROGRESS NOTES
"Subjective   Patient ID: Mele Mayes is a 44 y.o. female who presents for Thyroid Problem and Allergic Reaction.  Thyroid tests reviewed and her allergy tests reviewed.  She is still not happy working at [employer] but she has been taking her citalopram and amlodipine for blood pressure and depression.  We talked about her thyroid and allergy tests and that they are not abnormal.    Thyroid Problem  Patient reports no diarrhea or palpitations.   Allergic Reaction  Pertinent negatives include no chest pain or diarrhea.        Review of Systems   Constitutional:  Negative for activity change and chills.   HENT:  Positive for sore throat. Negative for congestion.    Respiratory:  Negative for chest tightness and shortness of breath.    Cardiovascular:  Negative for chest pain and palpitations.   Gastrointestinal:  Negative for diarrhea and nausea.   Musculoskeletal:  Negative for myalgias.   Neurological:  Negative for weakness.   Psychiatric/Behavioral:  Negative for agitation, behavioral problems and decreased concentration.        Objective   /89 (BP Location: Right arm, Patient Position: Sitting, BP Cuff Size: Adult)   Pulse 73   Temp 36.7 °C (98 °F)   Resp 18   Ht 1.549 m (5' 1\")   Wt 60.3 kg (133 lb)   LMP 06/15/2023   SpO2 98%   BMI 25.13 kg/m²     Physical Exam  Constitutional:       Appearance: Normal appearance.   HENT:      Head: Normocephalic and atraumatic.      Nose: Nose normal.      Mouth/Throat:      Mouth: Mucous membranes are moist.   Eyes:      Extraocular Movements: Extraocular movements intact.   Cardiovascular:      Rate and Rhythm: Normal rate and regular rhythm.   Pulmonary:      Effort: No respiratory distress.      Breath sounds: No wheezing.   Abdominal:      General: Bowel sounds are normal.      Palpations: Abdomen is soft.   Neurological:      General: No focal deficit present.       Assessment/Plan   Assessment & Plan  Primary hypertension  Good control       Hypovitaminosis " D  Taking replacement.       Anxiety  Much better.       Healthcare maintenance  Doing fine.

## 2025-04-16 LAB
25(OH)D3+25(OH)D2 SERPL-MCNC: 70 NG/ML (ref 30–100)
ALBUMIN SERPL-MCNC: 4.2 G/DL (ref 3.6–5.1)
ALP SERPL-CCNC: 157 U/L (ref 31–125)
ALT SERPL-CCNC: 32 U/L (ref 6–29)
ANION GAP SERPL CALCULATED.4IONS-SCNC: 9 MMOL/L (CALC) (ref 7–17)
AST SERPL-CCNC: 25 U/L (ref 10–30)
BILIRUB SERPL-MCNC: 0.3 MG/DL (ref 0.2–1.2)
BUN SERPL-MCNC: 15 MG/DL (ref 7–25)
CALCIUM SERPL-MCNC: 9.5 MG/DL (ref 8.6–10.2)
CHLORIDE SERPL-SCNC: 103 MMOL/L (ref 98–110)
CO2 SERPL-SCNC: 24 MMOL/L (ref 20–32)
CREAT SERPL-MCNC: 0.78 MG/DL (ref 0.5–0.99)
EGFRCR SERPLBLD CKD-EPI 2021: 96 ML/MIN/1.73M2
GLUCOSE SERPL-MCNC: 83 MG/DL (ref 65–99)
POTASSIUM SERPL-SCNC: 4.2 MMOL/L (ref 3.5–5.3)
PROT SERPL-MCNC: 7.9 G/DL (ref 6.1–8.1)
SODIUM SERPL-SCNC: 136 MMOL/L (ref 135–146)

## 2025-04-22 ENCOUNTER — TELEPHONE (OUTPATIENT)
Dept: PRIMARY CARE | Facility: CLINIC | Age: 45
End: 2025-04-22

## 2025-04-22 NOTE — TELEPHONE ENCOUNTER
CHAD    Patient is having doubts about the Celexa.  She feels her thyroid is the cause of the abnormal reactions she has been having recently.  Patient states she woke up this morning feeling dizziness which is something she never experienced before.      Ms. Regan want to get a second opinion for her thyroid concerns as she believes this the root of all her medical problems.  Also her goal is to not take any medications.  Patient called the office today to provide her this information.

## 2025-04-23 DIAGNOSIS — R79.89 TSH ELEVATION: Primary | ICD-10-CM

## 2025-04-23 NOTE — TELEPHONE ENCOUNTER
"The \"free thyroxine\" test done in follow up to an abnormal TSH was normal.  The test is more specific for thyroid disease. That's why I reassured her about the TSH . . . I added that as an add on to the blood that was drawn. But I put a referral in.    "

## 2025-04-28 NOTE — TELEPHONE ENCOUNTER
I called Ms. Regan this morning to inform her that the referral has been placed to see Endocrinology.  No answer;  However a voice mail message was left.    Oscar

## 2025-09-30 ENCOUNTER — APPOINTMENT (OUTPATIENT)
Dept: ENDOCRINOLOGY | Facility: CLINIC | Age: 45
End: 2025-09-30
Payer: COMMERCIAL

## 2025-10-21 ENCOUNTER — APPOINTMENT (OUTPATIENT)
Dept: PRIMARY CARE | Facility: CLINIC | Age: 45
End: 2025-10-21
Payer: COMMERCIAL